# Patient Record
Sex: FEMALE | Race: WHITE | Employment: OTHER | URBAN - METROPOLITAN AREA
[De-identification: names, ages, dates, MRNs, and addresses within clinical notes are randomized per-mention and may not be internally consistent; named-entity substitution may affect disease eponyms.]

---

## 2018-07-24 ENCOUNTER — OFFICE VISIT (OUTPATIENT)
Dept: INTERNAL MEDICINE CLINIC | Facility: CLINIC | Age: 63
End: 2018-07-24
Payer: COMMERCIAL

## 2018-07-24 VITALS
BODY MASS INDEX: 20.27 KG/M2 | HEART RATE: 88 BPM | RESPIRATION RATE: 16 BRPM | WEIGHT: 106 LBS | DIASTOLIC BLOOD PRESSURE: 72 MMHG | TEMPERATURE: 98 F | HEIGHT: 60.5 IN | SYSTOLIC BLOOD PRESSURE: 122 MMHG

## 2018-07-24 DIAGNOSIS — Z78.0 POSTMENOPAUSAL: ICD-10-CM

## 2018-07-24 DIAGNOSIS — R23.8 EASY BRUISABILITY: ICD-10-CM

## 2018-07-24 DIAGNOSIS — R19.7 DIARRHEA, UNSPECIFIED TYPE: ICD-10-CM

## 2018-07-24 DIAGNOSIS — Z80.0 FAMILY HISTORY OF ESOPHAGEAL CANCER: ICD-10-CM

## 2018-07-24 DIAGNOSIS — Z12.31 ENCOUNTER FOR SCREENING MAMMOGRAM FOR MALIGNANT NEOPLASM OF BREAST: Primary | ICD-10-CM

## 2018-07-24 DIAGNOSIS — Z00.00 LABORATORY EXAMINATION ORDERED AS PART OF A COMPLETE PHYSICAL EXAMINATION: ICD-10-CM

## 2018-07-24 DIAGNOSIS — L98.9 SKIN LESION: ICD-10-CM

## 2018-07-24 DIAGNOSIS — F17.218 CIGARETTE NICOTINE DEPENDENCE WITH OTHER NICOTINE-INDUCED DISORDER: Chronic | ICD-10-CM

## 2018-07-24 DIAGNOSIS — Z12.11 SCREEN FOR COLON CANCER: ICD-10-CM

## 2018-07-24 PROCEDURE — 99214 OFFICE O/P EST MOD 30 MIN: CPT | Performed by: NURSE PRACTITIONER

## 2018-07-24 NOTE — PROGRESS NOTES
Ethan Jordan is a 58year old female who presents as a new patient to establish:       Patient complains of:   Here to establish     Has not seen a physician in a few years. Overdue for health maintenance.      Skin lesions  Back and chest   Notes coon yes . Children: yes. Exercise: minimal.  Diet: watches fats closely and watches sugar closely  Smoker: Yes    Cessation advised:  she is not wanting help at this time. Alcohol Use:  yes      Concerns:  S/p fall in March. ETOH abuse  Fall with fracture. auscultation  CARDIO: RRR without murmur  GI: good BS's,no masses, HSM or tenderness  NEURO: Oriented times three    ASSESSMENT AND PLAN:      Skin lesion  Refer to derm  Dr Camelia Rosales  Family history of esophageal cancer  Would benefit from EGD  Easy bruis

## 2018-08-17 ENCOUNTER — LAB ENCOUNTER (OUTPATIENT)
Dept: LAB | Age: 63
End: 2018-08-17
Attending: NURSE PRACTITIONER
Payer: COMMERCIAL

## 2018-08-17 DIAGNOSIS — Z78.0 POSTMENOPAUSAL: ICD-10-CM

## 2018-08-17 DIAGNOSIS — R73.9 HYPERGLYCEMIA: ICD-10-CM

## 2018-08-17 DIAGNOSIS — Z00.00 LABORATORY EXAMINATION ORDERED AS PART OF A COMPLETE PHYSICAL EXAMINATION: ICD-10-CM

## 2018-08-17 DIAGNOSIS — R23.8 EASY BRUISABILITY: ICD-10-CM

## 2018-08-17 LAB
ALBUMIN SERPL-MCNC: 3.7 G/DL (ref 3.5–4.8)
ALBUMIN/GLOB SERPL: 1.1 {RATIO} (ref 1–2)
ALP LIVER SERPL-CCNC: 136 U/L (ref 50–130)
ALT SERPL-CCNC: 23 U/L (ref 14–54)
ANION GAP SERPL CALC-SCNC: 10 MMOL/L (ref 0–18)
APTT PPP: 34.2 SECONDS (ref 26.1–34.6)
AST SERPL-CCNC: 32 U/L (ref 15–41)
BASOPHILS # BLD AUTO: 0.05 X10(3) UL (ref 0–0.1)
BASOPHILS NFR BLD AUTO: 0.8 %
BILIRUB SERPL-MCNC: 0.7 MG/DL (ref 0.1–2)
BUN BLD-MCNC: 3 MG/DL (ref 8–20)
BUN/CREAT SERPL: 4.5 (ref 10–20)
CALCIUM BLD-MCNC: 9.3 MG/DL (ref 8.3–10.3)
CHLORIDE SERPL-SCNC: 100 MMOL/L (ref 101–111)
CHOLEST SMN-MCNC: 247 MG/DL (ref ?–200)
CO2 SERPL-SCNC: 25 MMOL/L (ref 22–32)
CREAT BLD-MCNC: 0.66 MG/DL (ref 0.55–1.02)
EOSINOPHIL # BLD AUTO: 0.07 X10(3) UL (ref 0–0.3)
EOSINOPHIL NFR BLD AUTO: 1.1 %
ERYTHROCYTE [DISTWIDTH] IN BLOOD BY AUTOMATED COUNT: 13.9 % (ref 11.5–16)
GLOBULIN PLAS-MCNC: 3.5 G/DL (ref 2.5–4)
GLUCOSE BLD-MCNC: 120 MG/DL (ref 70–99)
HCT VFR BLD AUTO: 43 % (ref 34–50)
HDLC SERPL-MCNC: 116 MG/DL (ref 40–59)
HGB BLD-MCNC: 14.4 G/DL (ref 12–16)
IMMATURE GRANULOCYTE COUNT: 0.02 X10(3) UL (ref 0–1)
IMMATURE GRANULOCYTE RATIO %: 0.3 %
INR BLD: 0.89 (ref 0.9–1.1)
LDLC SERPL CALC-MCNC: 113 MG/DL (ref ?–100)
LYMPHOCYTES # BLD AUTO: 1.41 X10(3) UL (ref 0.9–4)
LYMPHOCYTES NFR BLD AUTO: 21.3 %
M PROTEIN MFR SERPL ELPH: 7.2 G/DL (ref 6.1–8.3)
MCH RBC QN AUTO: 35.6 PG (ref 27–33.2)
MCHC RBC AUTO-ENTMCNC: 33.5 G/DL (ref 31–37)
MCV RBC AUTO: 106.4 FL (ref 81–100)
MONOCYTES # BLD AUTO: 0.44 X10(3) UL (ref 0.1–1)
MONOCYTES NFR BLD AUTO: 6.7 %
NEUTROPHIL ABS PRELIM: 4.62 X10 (3) UL (ref 1.3–6.7)
NEUTROPHILS # BLD AUTO: 4.62 X10(3) UL (ref 1.3–6.7)
NEUTROPHILS NFR BLD AUTO: 69.8 %
NONHDLC SERPL-MCNC: 131 MG/DL (ref ?–130)
OSMOLALITY SERPL CALC.SUM OF ELEC: 278 MOSM/KG (ref 275–295)
PLATELET # BLD AUTO: 242 10(3)UL (ref 150–450)
POTASSIUM SERPL-SCNC: 3.8 MMOL/L (ref 3.6–5.1)
PSA SERPL DL<=0.01 NG/ML-MCNC: 12.4 SECONDS (ref 12.4–14.7)
RBC # BLD AUTO: 4.04 X10(6)UL (ref 3.8–5.1)
RED CELL DISTRIBUTION WIDTH-SD: 54.7 FL (ref 35.1–46.3)
SODIUM SERPL-SCNC: 135 MMOL/L (ref 136–144)
TRIGL SERPL-MCNC: 88 MG/DL (ref 30–149)
TSI SER-ACNC: 1.1 MIU/ML (ref 0.35–5.5)
VIT D+METAB SERPL-MCNC: 9.8 NG/ML (ref 30–100)
VLDLC SERPL CALC-MCNC: 18 MG/DL (ref 0–30)
WBC # BLD AUTO: 6.6 X10(3) UL (ref 4–13)

## 2018-08-17 PROCEDURE — 85610 PROTHROMBIN TIME: CPT | Performed by: NURSE PRACTITIONER

## 2018-08-17 PROCEDURE — 85730 THROMBOPLASTIN TIME PARTIAL: CPT | Performed by: NURSE PRACTITIONER

## 2018-08-17 PROCEDURE — 82306 VITAMIN D 25 HYDROXY: CPT | Performed by: NURSE PRACTITIONER

## 2018-08-17 PROCEDURE — 80061 LIPID PANEL: CPT | Performed by: NURSE PRACTITIONER

## 2018-08-17 PROCEDURE — 83036 HEMOGLOBIN GLYCOSYLATED A1C: CPT | Performed by: NURSE PRACTITIONER

## 2018-08-17 PROCEDURE — 80050 GENERAL HEALTH PANEL: CPT | Performed by: NURSE PRACTITIONER

## 2018-08-19 DIAGNOSIS — R73.9 HYPERGLYCEMIA: Primary | ICD-10-CM

## 2018-08-19 LAB
EST. AVERAGE GLUCOSE BLD GHB EST-MCNC: 71 MG/DL (ref 68–126)
HBA1C MFR BLD HPLC: 4.1 % (ref ?–5.7)

## 2018-08-29 ENCOUNTER — APPOINTMENT (OUTPATIENT)
Dept: LAB | Age: 63
End: 2018-08-29
Attending: NURSE PRACTITIONER
Payer: COMMERCIAL

## 2018-08-29 ENCOUNTER — OFFICE VISIT (OUTPATIENT)
Dept: INTERNAL MEDICINE CLINIC | Facility: CLINIC | Age: 63
End: 2018-08-29
Payer: COMMERCIAL

## 2018-08-29 VITALS
BODY MASS INDEX: 21.39 KG/M2 | SYSTOLIC BLOOD PRESSURE: 100 MMHG | TEMPERATURE: 98 F | WEIGHT: 111.81 LBS | DIASTOLIC BLOOD PRESSURE: 66 MMHG | HEIGHT: 60.5 IN | HEART RATE: 108 BPM

## 2018-08-29 DIAGNOSIS — R53.83 FATIGUE, UNSPECIFIED TYPE: ICD-10-CM

## 2018-08-29 DIAGNOSIS — Z80.0 FAMILY HISTORY OF ESOPHAGEAL CANCER: ICD-10-CM

## 2018-08-29 DIAGNOSIS — F17.200 SMOKER: ICD-10-CM

## 2018-08-29 DIAGNOSIS — E55.9 VITAMIN D DEFICIENCY: Primary | ICD-10-CM

## 2018-08-29 LAB — HAV AB SERPL IA-ACNC: 412 PG/ML (ref 193–986)

## 2018-08-29 PROCEDURE — 82607 VITAMIN B-12: CPT | Performed by: NURSE PRACTITIONER

## 2018-08-29 PROCEDURE — 99214 OFFICE O/P EST MOD 30 MIN: CPT | Performed by: NURSE PRACTITIONER

## 2018-08-29 RX ORDER — ERGOCALCIFEROL 1.25 MG/1
50000 CAPSULE ORAL WEEKLY
Qty: 12 CAPSULE | Refills: 0 | Status: SHIPPED | OUTPATIENT
Start: 2018-08-29 | End: 2018-09-28

## 2018-08-29 RX ORDER — NICOTINE 21 MG/24HR
1 PATCH, TRANSDERMAL 24 HOURS TRANSDERMAL EVERY 24 HOURS
Qty: 30 PATCH | Refills: 0 | Status: SHIPPED | OUTPATIENT
Start: 2018-08-29 | End: 2019-01-01

## 2018-08-29 RX ORDER — NICOTINE 21 MG/24HR
1 PATCH, TRANSDERMAL 24 HOURS TRANSDERMAL EVERY 24 HOURS
Qty: 14 PATCH | Refills: 0 | Status: SHIPPED | OUTPATIENT
Start: 2018-08-29 | End: 2019-01-01

## 2018-08-29 NOTE — PROGRESS NOTES
Shaista Spear is a 58year old female. Patient presents with:  Test Results: LG. Room  11. HPI:   Here to review labs. New keesha in July for CPE. Smoking  Wants to quit.    Did well with nicoderm in the past.   Smoking cessation assistanc Packs/day: 0.25      Years: 0.00         Types: Cigarettes  Smokeless tobacco: Never Used                      Alcohol use: Yes           1.8 oz/week     Shots of liquor: 3 per week     Comment: cage 7/24/18    Family History   Problem Relation Age of Onse B12 [E]    Meds & Refills for this Visit:  Signed Prescriptions Disp Refills    ergocalciferol 12741 units Oral Cap 12 capsule 0      Sig: Take 1 capsule (50,000 Units total) by mouth once a week.       nicotine 21 MG/24HR Transdermal Patch 24 Hr 30 patch 0

## 2018-08-29 NOTE — PATIENT INSTRUCTIONS
Smoking cessation assistance: (Transdermal, Habitrol®) If currently smoking more than 10 cigarettes/day: Apply one 21-mg patch transdermally daily for weeks 1 through 4, then one 14-mg patch daily for weeks 5 and 6, and then one 7-mg patch daily for weeks

## 2019-01-01 ENCOUNTER — APPOINTMENT (OUTPATIENT)
Dept: GENERAL RADIOLOGY | Age: 64
End: 2019-01-01
Attending: NURSE PRACTITIONER
Payer: COMMERCIAL

## 2019-01-01 ENCOUNTER — APPOINTMENT (OUTPATIENT)
Dept: BONE DENSITY | Age: 64
End: 2019-01-01
Attending: NURSE PRACTITIONER
Payer: COMMERCIAL

## 2019-01-01 ENCOUNTER — OFFICE VISIT (OUTPATIENT)
Dept: INTERNAL MEDICINE CLINIC | Facility: CLINIC | Age: 64
End: 2019-01-01
Payer: COMMERCIAL

## 2019-01-01 ENCOUNTER — TELEPHONE (OUTPATIENT)
Dept: INTERNAL MEDICINE CLINIC | Facility: CLINIC | Age: 64
End: 2019-01-01

## 2019-01-01 ENCOUNTER — HOSPITAL ENCOUNTER (OUTPATIENT)
Dept: GENERAL RADIOLOGY | Age: 64
Discharge: HOME | End: 2019-01-01
Attending: NURSE PRACTITIONER
Payer: COMMERCIAL

## 2019-01-01 ENCOUNTER — LAB ENCOUNTER (OUTPATIENT)
Dept: LAB | Age: 64
End: 2019-01-01
Attending: NURSE PRACTITIONER
Payer: COMMERCIAL

## 2019-01-01 ENCOUNTER — HOSPITAL ENCOUNTER (OUTPATIENT)
Dept: MRI IMAGING | Age: 64
Discharge: HOME | End: 2019-01-01
Attending: NURSE PRACTITIONER
Payer: COMMERCIAL

## 2019-01-01 ENCOUNTER — HOSPITAL ENCOUNTER (OUTPATIENT)
Dept: MAMMOGRAPHY | Age: 64
End: 2019-01-01
Attending: NURSE PRACTITIONER
Payer: COMMERCIAL

## 2019-01-01 ENCOUNTER — HOSPITAL ENCOUNTER (OUTPATIENT)
Dept: BONE DENSITY | Age: 64
Discharge: HOME | End: 2019-01-01
Attending: NURSE PRACTITIONER
Payer: COMMERCIAL

## 2019-01-01 ENCOUNTER — APPOINTMENT (OUTPATIENT)
Dept: LAB | Age: 64
End: 2019-01-01
Attending: INTERNAL MEDICINE
Payer: COMMERCIAL

## 2019-01-01 ENCOUNTER — HOSPITAL ENCOUNTER (OUTPATIENT)
Dept: CT IMAGING | Age: 64
Discharge: HOME | End: 2019-01-01
Attending: NURSE PRACTITIONER
Payer: COMMERCIAL

## 2019-01-01 ENCOUNTER — HOSPITAL ENCOUNTER (OUTPATIENT)
Dept: MAMMOGRAPHY | Facility: HOSPITAL | Age: 64
Discharge: HOME | End: 2019-01-01
Attending: NURSE PRACTITIONER
Payer: COMMERCIAL

## 2019-01-01 ENCOUNTER — HOSPITAL ENCOUNTER (OUTPATIENT)
Dept: ULTRASOUND IMAGING | Age: 64
Discharge: HOME | End: 2019-01-01
Attending: INTERNAL MEDICINE
Payer: COMMERCIAL

## 2019-01-01 ENCOUNTER — HOSPITAL ENCOUNTER (OUTPATIENT)
Dept: MAMMOGRAPHY | Age: 64
Discharge: HOME | End: 2019-01-01
Attending: NURSE PRACTITIONER
Payer: COMMERCIAL

## 2019-01-01 VITALS
SYSTOLIC BLOOD PRESSURE: 142 MMHG | TEMPERATURE: 98 F | DIASTOLIC BLOOD PRESSURE: 90 MMHG | RESPIRATION RATE: 28 BRPM | WEIGHT: 117 LBS | HEIGHT: 60.5 IN | HEART RATE: 108 BPM | BODY MASS INDEX: 22.38 KG/M2

## 2019-01-01 DIAGNOSIS — E55.9 VITAMIN D DEFICIENCY: ICD-10-CM

## 2019-01-01 DIAGNOSIS — Z78.0 POSTMENOPAUSAL: ICD-10-CM

## 2019-01-01 DIAGNOSIS — Z13.29 SCREENING FOR THYROID DISORDER: ICD-10-CM

## 2019-01-01 DIAGNOSIS — R79.89 ELEVATED LFTS: ICD-10-CM

## 2019-01-01 DIAGNOSIS — R19.7 DIARRHEA, UNSPECIFIED TYPE: ICD-10-CM

## 2019-01-01 DIAGNOSIS — E87.6 HYPOKALEMIA: ICD-10-CM

## 2019-01-01 DIAGNOSIS — F17.200 SMOKER: ICD-10-CM

## 2019-01-01 DIAGNOSIS — R10.2 PELVIC PAIN: ICD-10-CM
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
## 2019-01-01 DIAGNOSIS — Z12.31 ENCOUNTER FOR SCREENING MAMMOGRAM FOR MALIGNANT NEOPLASM OF BREAST: ICD-10-CM

## 2019-01-01 DIAGNOSIS — R59.9 ADENOPATHY: ICD-10-CM

## 2019-01-01 DIAGNOSIS — M54.50 ACUTE MIDLINE LOW BACK PAIN WITHOUT SCIATICA: ICD-10-CM

## 2019-01-01 DIAGNOSIS — R93.89 ABNORMAL CXR: ICD-10-CM

## 2019-01-01 DIAGNOSIS — R92.2 INCONCLUSIVE MAMMOGRAM: ICD-10-CM

## 2019-01-01 DIAGNOSIS — Z87.81 HISTORY OF FRACTURE OF HUMERUS: ICD-10-CM

## 2019-01-01 DIAGNOSIS — R68.81 EARLY SATIETY: ICD-10-CM

## 2019-01-01 DIAGNOSIS — Z00.00 ROUTINE GENERAL MEDICAL EXAMINATION AT A HEALTH CARE FACILITY: Primary | ICD-10-CM

## 2019-01-01 DIAGNOSIS — R26.81 UNSTEADY GAIT: ICD-10-CM

## 2019-01-01 DIAGNOSIS — R93.89 ABNORMAL CT OF THE CHEST: ICD-10-CM

## 2019-01-01 DIAGNOSIS — Z00.00 ROUTINE GENERAL MEDICAL EXAMINATION AT A HEALTH CARE FACILITY: ICD-10-CM

## 2019-01-01 DIAGNOSIS — Z13.228 SCREENING FOR METABOLIC DISORDER: ICD-10-CM

## 2019-01-01 DIAGNOSIS — F17.218 CIGARETTE NICOTINE DEPENDENCE WITH OTHER NICOTINE-INDUCED DISORDER: Chronic | ICD-10-CM

## 2019-01-01 DIAGNOSIS — Z11.51 SCREENING FOR HUMAN PAPILLOMAVIRUS (HPV): ICD-10-CM

## 2019-01-01 DIAGNOSIS — D58.2 ELEVATED HEMOGLOBIN (HCC): ICD-10-CM

## 2019-01-01 DIAGNOSIS — E87.6 HYPOKALEMIA: Primary | ICD-10-CM

## 2019-01-01 DIAGNOSIS — R05.9 COUGH: ICD-10-CM

## 2019-01-01 DIAGNOSIS — F17.218 CIGARETTE NICOTINE DEPENDENCE WITH OTHER NICOTINE-INDUCED DISORDER: ICD-10-CM

## 2019-01-01 DIAGNOSIS — Z12.11 SCREEN FOR COLON CANCER: ICD-10-CM

## 2019-01-01 DIAGNOSIS — R93.89 ABNORMAL CXR: Primary | ICD-10-CM

## 2019-01-01 DIAGNOSIS — R25.1 TREMOR: ICD-10-CM

## 2019-01-01 DIAGNOSIS — R05.9 COUGH: Primary | ICD-10-CM

## 2019-01-01 DIAGNOSIS — Z12.4 SCREENING FOR CERVICAL CANCER: ICD-10-CM

## 2019-01-01 DIAGNOSIS — Z80.0 FAMILY HISTORY OF ESOPHAGEAL CANCER: ICD-10-CM

## 2019-01-01 DIAGNOSIS — Z13.0 SCREENING FOR DISORDER OF BLOOD AND BLOOD-FORMING ORGANS: ICD-10-CM

## 2019-01-01 DIAGNOSIS — R03.0 ELEVATED BLOOD PRESSURE READING WITHOUT DIAGNOSIS OF HYPERTENSION: ICD-10-CM

## 2019-01-01 DIAGNOSIS — Z13.220 SCREENING FOR LIPID DISORDERS: ICD-10-CM

## 2019-01-01 LAB
ALBUMIN SERPL-MCNC: 3.1 G/DL (ref 3.4–5)
ALBUMIN SERPL-MCNC: 3.4 G/DL (ref 3.4–5)
ALBUMIN/GLOB SERPL: 0.9 {RATIO} (ref 1–2)
ALBUMIN/GLOB SERPL: 1 {RATIO} (ref 1–2)
ALP LIVER SERPL-CCNC: 152 U/L (ref 50–130)
ALP LIVER SERPL-CCNC: 200 U/L (ref 50–130)
ALT SERPL-CCNC: 55 U/L (ref 13–56)
ALT SERPL-CCNC: 67 U/L (ref 13–56)
ANION GAP SERPL CALC-SCNC: 11 MMOL/L (ref 0–18)
ANION GAP SERPL CALC-SCNC: 15 MMOL/L (ref 0–18)
AST SERPL-CCNC: 184 U/L (ref 15–37)
AST SERPL-CCNC: 87 U/L (ref 15–37)
BASOPHILS # BLD AUTO: 0.04 X10(3) UL (ref 0–0.2)
BASOPHILS NFR BLD AUTO: 0.8 %
BILIRUB SERPL-MCNC: 1.5 MG/DL (ref 0.1–2)
BILIRUB SERPL-MCNC: 1.8 MG/DL (ref 0.1–2)
BUN BLD-MCNC: 5 MG/DL (ref 7–18)
BUN BLD-MCNC: 7 MG/DL (ref 7–18)
BUN/CREAT SERPL: 7.5 (ref 10–20)
BUN/CREAT SERPL: 9.2 (ref 10–20)
CALCIUM BLD-MCNC: 8 MG/DL (ref 8.5–10.1)
CALCIUM BLD-MCNC: 8.9 MG/DL (ref 8.5–10.1)
CHLORIDE SERPL-SCNC: 102 MMOL/L (ref 98–112)
CHLORIDE SERPL-SCNC: 97 MMOL/L (ref 98–112)
CHOLEST SMN-MCNC: 237 MG/DL (ref ?–200)
CO2 SERPL-SCNC: 21 MMOL/L (ref 21–32)
CO2 SERPL-SCNC: 26 MMOL/L (ref 21–32)
CREAT BLD-MCNC: 0.67 MG/DL (ref 0.55–1.02)
CREAT BLD-MCNC: 0.76 MG/DL (ref 0.55–1.02)
DEPRECATED HBV CORE AB SER IA-ACNC: 588.4 NG/ML (ref 18–340)
DEPRECATED RDW RBC AUTO: 53.4 FL (ref 35.1–46.3)
EOSINOPHIL # BLD AUTO: 0.02 X10(3) UL (ref 0–0.7)
EOSINOPHIL NFR BLD AUTO: 0.4 %
ERYTHROCYTE [DISTWIDTH] IN BLOOD BY AUTOMATED COUNT: 13.5 % (ref 11–15)
GLOBULIN PLAS-MCNC: 3.4 G/DL (ref 2.8–4.4)
GLOBULIN PLAS-MCNC: 3.4 G/DL (ref 2.8–4.4)
GLUCOSE BLD-MCNC: 103 MG/DL (ref 70–99)
GLUCOSE BLD-MCNC: 99 MG/DL (ref 70–99)
GLUCOSE UR STRIP.AUTO-MCNC: NEGATIVE MG/DL
HAV IGM SER QL: NONREACTIVE
HBV CORE IGM SER QL: NONREACTIVE
HBV SURFACE AG SERPL QL IA: NONREACTIVE
HCT VFR BLD AUTO: 46.6 % (ref 35–48)
HCV AB SERPL QL IA: NONREACTIVE
HDLC SERPL-MCNC: 124 MG/DL (ref 40–59)
HGB BLD-MCNC: 16.3 G/DL (ref 12–16)
HPV I/H RISK 1 DNA SPEC QL NAA+PROBE: POSITIVE
HPV16 DNA CVX QL PROBE+SIG AMP: NEGATIVE
HPV18 DNA CVX QL PROBE+SIG AMP: NEGATIVE
HYALINE CASTS #/AREA URNS AUTO: PRESENT /LPF
IMM GRANULOCYTES # BLD AUTO: 0.02 X10(3) UL (ref 0–1)
IMM GRANULOCYTES NFR BLD: 0.4 %
KETONES UR STRIP.AUTO-MCNC: 20 MG/DL
LAST PAP RESULT: NORMAL
LDLC SERPL CALC-MCNC: 93 MG/DL (ref ?–100)
LYMPHOCYTES # BLD AUTO: 1.21 X10(3) UL (ref 1–4)
LYMPHOCYTES NFR BLD AUTO: 23.3 %
M PROTEIN MFR SERPL ELPH: 6.5 G/DL (ref 6.4–8.2)
M PROTEIN MFR SERPL ELPH: 6.8 G/DL (ref 6.4–8.2)
MCH RBC QN AUTO: 37 PG (ref 26–34)
MCHC RBC AUTO-ENTMCNC: 35 G/DL (ref 31–37)
MCV RBC AUTO: 105.9 FL (ref 80–100)
MONOCYTES # BLD AUTO: 0.31 X10(3) UL (ref 0.1–1)
MONOCYTES NFR BLD AUTO: 6 %
NEUTROPHILS # BLD AUTO: 3.6 X10 (3) UL (ref 1.5–7.7)
NEUTROPHILS # BLD AUTO: 3.6 X10(3) UL (ref 1.5–7.7)
NEUTROPHILS NFR BLD AUTO: 69.1 %
NITRITE UR QL STRIP.AUTO: NEGATIVE
NONHDLC SERPL-MCNC: 113 MG/DL (ref ?–130)
OSMOLALITY SERPL CALC.SUM OF ELEC: 274 MOSM/KG (ref 275–295)
OSMOLALITY SERPL CALC.SUM OF ELEC: 285 MOSM/KG (ref 275–295)
PH UR STRIP.AUTO: 5 [PH] (ref 4.5–8)
PLATELET # BLD AUTO: 208 10(3)UL (ref 150–450)
POTASSIUM SERPL-SCNC: 3.3 MMOL/L (ref 3.5–5.1)
POTASSIUM SERPL-SCNC: 3.4 MMOL/L (ref 3.5–5.1)
PROT UR STRIP.AUTO-MCNC: 30 MG/DL
RBC # BLD AUTO: 4.4 X10(6)UL (ref 3.8–5.3)
SODIUM SERPL-SCNC: 133 MMOL/L (ref 136–145)
SODIUM SERPL-SCNC: 139 MMOL/L (ref 136–145)
SP GR UR STRIP.AUTO: 1.02 (ref 1–1.03)
TRIGL SERPL-MCNC: 99 MG/DL (ref 30–149)
TSI SER-ACNC: 1.12 MIU/ML (ref 0.36–3.74)
UROBILINOGEN UR STRIP.AUTO-MCNC: 2 MG/DL
VIT D+METAB SERPL-MCNC: 12.6 NG/ML (ref 30–100)
VLDLC SERPL CALC-MCNC: 20 MG/DL (ref 0–30)
WBC # BLD AUTO: 5.2 X10(3) UL (ref 4–11)
YEAST URINE: PRESENT

## 2019-01-01 PROCEDURE — 82104 ALPHA-1-ANTITRYPSIN PHENO: CPT

## 2019-01-01 PROCEDURE — 80076 HEPATIC FUNCTION PANEL: CPT

## 2019-01-01 PROCEDURE — 72110 X-RAY EXAM L-2 SPINE 4/>VWS: CPT | Performed by: NURSE PRACTITIONER

## 2019-01-01 PROCEDURE — 72190 X-RAY EXAM OF PELVIS: CPT | Performed by: NURSE PRACTITIONER

## 2019-01-01 PROCEDURE — 90732 PPSV23 VACC 2 YRS+ SUBQ/IM: CPT | Performed by: NURSE PRACTITIONER

## 2019-01-01 PROCEDURE — 88175 CYTOPATH C/V AUTO FLUID REDO: CPT | Performed by: NURSE PRACTITIONER

## 2019-01-01 PROCEDURE — 77066 DX MAMMO INCL CAD BI: CPT | Performed by: NURSE PRACTITIONER

## 2019-01-01 PROCEDURE — 77063 BREAST TOMOSYNTHESIS BI: CPT | Performed by: NURSE PRACTITIONER

## 2019-01-01 PROCEDURE — 83516 IMMUNOASSAY NONANTIBODY: CPT

## 2019-01-01 PROCEDURE — 82565 ASSAY OF CREATININE: CPT

## 2019-01-01 PROCEDURE — 83550 IRON BINDING TEST: CPT

## 2019-01-01 PROCEDURE — 80074 ACUTE HEPATITIS PANEL: CPT | Performed by: NURSE PRACTITIONER

## 2019-01-01 PROCEDURE — 77080 DXA BONE DENSITY AXIAL: CPT | Performed by: NURSE PRACTITIONER

## 2019-01-01 PROCEDURE — 82306 VITAMIN D 25 HYDROXY: CPT | Performed by: NURSE PRACTITIONER

## 2019-01-01 PROCEDURE — 80053 COMPREHEN METABOLIC PANEL: CPT | Performed by: NURSE PRACTITIONER

## 2019-01-01 PROCEDURE — 99396 PREV VISIT EST AGE 40-64: CPT | Performed by: NURSE PRACTITIONER

## 2019-01-01 PROCEDURE — 77062 BREAST TOMOSYNTHESIS BI: CPT | Performed by: NURSE PRACTITIONER

## 2019-01-01 PROCEDURE — 90471 IMMUNIZATION ADMIN: CPT | Performed by: NURSE PRACTITIONER

## 2019-01-01 PROCEDURE — 82728 ASSAY OF FERRITIN: CPT | Performed by: NURSE PRACTITIONER

## 2019-01-01 PROCEDURE — 82103 ALPHA-1-ANTITRYPSIN TOTAL: CPT

## 2019-01-01 PROCEDURE — 87624 HPV HI-RISK TYP POOLED RSLT: CPT | Performed by: NURSE PRACTITIONER

## 2019-01-01 PROCEDURE — 82728 ASSAY OF FERRITIN: CPT

## 2019-01-01 PROCEDURE — 36415 COLL VENOUS BLD VENIPUNCTURE: CPT | Performed by: NURSE PRACTITIONER

## 2019-01-01 PROCEDURE — 71046 X-RAY EXAM CHEST 2 VIEWS: CPT | Performed by: NURSE PRACTITIONER

## 2019-01-01 PROCEDURE — 80061 LIPID PANEL: CPT | Performed by: NURSE PRACTITIONER

## 2019-01-01 PROCEDURE — 82784 ASSAY IGA/IGD/IGG/IGM EACH: CPT

## 2019-01-01 PROCEDURE — 83540 ASSAY OF IRON: CPT

## 2019-01-01 PROCEDURE — 36415 COLL VENOUS BLD VENIPUNCTURE: CPT

## 2019-01-01 PROCEDURE — 77067 SCR MAMMO BI INCL CAD: CPT | Performed by: NURSE PRACTITIONER

## 2019-01-01 PROCEDURE — 81001 URINALYSIS AUTO W/SCOPE: CPT | Performed by: NURSE PRACTITIONER

## 2019-01-01 PROCEDURE — 87625 HPV TYPES 16 & 18 ONLY: CPT | Performed by: NURSE PRACTITIONER

## 2019-01-01 PROCEDURE — 71260 CT THORAX DX C+: CPT | Performed by: NURSE PRACTITIONER

## 2019-01-01 PROCEDURE — 80050 GENERAL HEALTH PANEL: CPT | Performed by: NURSE PRACTITIONER

## 2019-01-01 PROCEDURE — 87086 URINE CULTURE/COLONY COUNT: CPT | Performed by: NURSE PRACTITIONER

## 2019-01-01 PROCEDURE — 70551 MRI BRAIN STEM W/O DYE: CPT | Performed by: NURSE PRACTITIONER

## 2019-01-01 PROCEDURE — 76642 ULTRASOUND BREAST LIMITED: CPT | Performed by: NURSE PRACTITIONER

## 2019-01-01 PROCEDURE — 76700 US EXAM ABDOM COMPLETE: CPT | Performed by: INTERNAL MEDICINE

## 2019-01-01 PROCEDURE — 86708 HEPATITIS A ANTIBODY: CPT

## 2019-01-01 PROCEDURE — 86706 HEP B SURFACE ANTIBODY: CPT

## 2019-01-01 RX ORDER — AZITHROMYCIN 250 MG/1
TABLET, FILM COATED ORAL
Qty: 6 TABLET | Refills: 0 | Status: SHIPPED | OUTPATIENT
Start: 2019-01-01 | End: 2020-01-01 | Stop reason: ALTCHOICE

## 2019-01-01 RX ORDER — POTASSIUM CHLORIDE 1500 MG/1
40 TABLET, FILM COATED, EXTENDED RELEASE ORAL DAILY
Qty: 4 TABLET | Refills: 0 | Status: SHIPPED | OUTPATIENT
Start: 2019-01-01 | End: 2019-01-01

## 2019-04-10 ENCOUNTER — TELEPHONE (OUTPATIENT)
Dept: INTERNAL MEDICINE CLINIC | Facility: CLINIC | Age: 64
End: 2019-04-10

## 2019-04-17 ENCOUNTER — TELEPHONE (OUTPATIENT)
Dept: INTERNAL MEDICINE CLINIC | Facility: CLINIC | Age: 64
End: 2019-04-17

## 2019-04-17 DIAGNOSIS — Z00.00 ROUTINE GENERAL MEDICAL EXAMINATION AT A HEALTH CARE FACILITY: Primary | ICD-10-CM

## 2019-04-17 DIAGNOSIS — Z13.29 SCREENING FOR THYROID DISORDER: ICD-10-CM

## 2019-04-17 DIAGNOSIS — Z13.0 SCREENING FOR DISORDER OF BLOOD AND BLOOD-FORMING ORGANS: ICD-10-CM

## 2019-04-17 DIAGNOSIS — Z13.228 SCREENING FOR METABOLIC DISORDER: ICD-10-CM

## 2019-04-17 DIAGNOSIS — Z13.220 SCREENING FOR LIPID DISORDERS: ICD-10-CM

## 2019-04-17 NOTE — TELEPHONE ENCOUNTER
Future Appointments   Date Time Provider Evelia Renuka   7/26/2019  1:30 PM Erlene Days, APRN [facility]     Pt would like BW orders sent to [facility] pls.  Pt aware to fast.

## 2019-08-14 PROBLEM — R25.1 TREMOR: Status: ACTIVE | Noted: 2019-01-01

## 2019-08-14 PROBLEM — R05.9 COUGH: Status: ACTIVE | Noted: 2019-01-01

## 2019-08-14 PROBLEM — R03.0 ELEVATED BLOOD PRESSURE READING WITHOUT DIAGNOSIS OF HYPERTENSION: Status: ACTIVE | Noted: 2019-01-01

## 2019-08-14 PROBLEM — R26.81 UNSTEADY GAIT: Status: ACTIVE | Noted: 2019-01-01

## 2019-08-14 PROBLEM — Z78.0 POSTMENOPAUSAL: Status: ACTIVE | Noted: 2019-01-01

## 2019-08-14 PROBLEM — R68.81 EARLY SATIETY: Status: ACTIVE | Noted: 2019-01-01

## 2019-08-14 NOTE — PROGRESS NOTES
Juana Voss is a 61year old female who presents for a complete physical exam:       Patient complains of:    Overdue for f/u and multiple issues today. Smoker   Has cut back.   Cough  Worse in am.  Better during the day but still bothersome through by mouth daily with breakfast. Disp:  Rfl:    Multiple Vitamins-Minerals (HAIR/SKIN/NAILS) Oral Tab Take 1 tablet by mouth daily. Disp:  Rfl:       No past medical history on file.    Past Surgical History:   Procedure Laterality Date   • OTHER SURGICAL HIS Breastfeeding? No   BMI 22.47 kg/m²   Body mass index is 22.47 kg/m².    GENERAL: well developed, well nourished,in no apparent distress  SKIN: no rashes,no suspicious lesions  HEENT: atraumatic, normocephalic,ears and throat are clear  EYES:PERRLA, EOMI, Hpv Dna  High Risk , Thin Prep Collect      UA/M With Culture Reflex [E]      Pneumococcal 23, Adult (Pneumovax) (26154) (Dx V03.82/Z23)      ThinPrep PAP Smear      Meds & Refills for this Visit:  Requested Prescriptions      No prescriptions requested

## 2019-11-12 PROBLEM — R87.618 PAP SMEAR ABNORMALITY OF CERVIX/HUMAN PAPILLOMAVIRUS (HPV) POSITIVE: Status: ACTIVE | Noted: 2019-01-01

## 2019-11-12 PROBLEM — M81.0 AGE-RELATED OSTEOPOROSIS WITHOUT CURRENT PATHOLOGICAL FRACTURE: Status: ACTIVE | Noted: 2019-01-01

## 2019-11-12 PROBLEM — R93.89 ABNORMAL CHEST X-RAY: Status: ACTIVE | Noted: 2019-01-01

## 2019-11-12 PROBLEM — R79.89 ELEVATED FERRITIN: Status: ACTIVE | Noted: 2019-01-01

## 2019-11-12 PROBLEM — R87.610 ATYPICAL SQUAMOUS CELLS OF UNDETERMINED SIGNIFICANCE ON CYTOLOGIC SMEAR OF CERVIX (ASC-US): Status: ACTIVE | Noted: 2019-01-01

## 2019-11-12 PROBLEM — D58.2 ELEVATED HEMOGLOBIN (HCC): Status: ACTIVE | Noted: 2019-01-01

## 2019-11-13 NOTE — TELEPHONE ENCOUNTER
Spoke to , Raghavendra Candelaria, on HIPPA  Will order testing for her to have completed as she is apprehensive about returning to see me   CT Chest.    Referral to gyne. Repeat labs. Orders placed.    He is aware  Will share with her in hopes to engage her in her

## 2020-01-01 ENCOUNTER — APPOINTMENT (OUTPATIENT)
Dept: ULTRASOUND IMAGING | Facility: HOSPITAL | Age: 65
DRG: 391 | End: 2020-01-01
Attending: INTERNAL MEDICINE
Payer: COMMERCIAL

## 2020-01-01 ENCOUNTER — APPOINTMENT (OUTPATIENT)
Dept: CT IMAGING | Facility: HOSPITAL | Age: 65
DRG: 391 | End: 2020-01-01
Attending: EMERGENCY MEDICINE
Payer: COMMERCIAL

## 2020-01-01 ENCOUNTER — HOSPITAL ENCOUNTER (INPATIENT)
Facility: HOSPITAL | Age: 65
LOS: 3 days | Discharge: HOSPICE | DRG: 177 | End: 2020-01-01
Attending: HOSPITALIST | Admitting: HOSPITALIST
Payer: OTHER MISCELLANEOUS

## 2020-01-01 ENCOUNTER — APPOINTMENT (OUTPATIENT)
Dept: CT IMAGING | Facility: HOSPITAL | Age: 65
DRG: 391 | End: 2020-01-01
Attending: HOSPITALIST
Payer: COMMERCIAL

## 2020-01-01 ENCOUNTER — HOSPITAL ENCOUNTER (INPATIENT)
Facility: HOSPITAL | Age: 65
LOS: 15 days | Discharge: NURSING, REHAB OR LONG-TERM CARE | DRG: 391 | End: 2020-01-01
Attending: EMERGENCY MEDICINE | Admitting: HOSPITALIST
Payer: COMMERCIAL

## 2020-01-01 ENCOUNTER — EXTERNAL FACILITY (OUTPATIENT)
Dept: FAMILY MEDICINE CLINIC | Facility: CLINIC | Age: 65
End: 2020-01-01

## 2020-01-01 ENCOUNTER — APPOINTMENT (OUTPATIENT)
Dept: GENERAL RADIOLOGY | Facility: HOSPITAL | Age: 65
DRG: 391 | End: 2020-01-01
Attending: HOSPITALIST
Payer: COMMERCIAL

## 2020-01-01 ENCOUNTER — APPOINTMENT (OUTPATIENT)
Dept: GENERAL RADIOLOGY | Facility: HOSPITAL | Age: 65
DRG: 177 | End: 2020-01-01
Attending: EMERGENCY MEDICINE
Payer: COMMERCIAL

## 2020-01-01 ENCOUNTER — APPOINTMENT (OUTPATIENT)
Dept: GENERAL RADIOLOGY | Facility: HOSPITAL | Age: 65
DRG: 391 | End: 2020-01-01
Attending: RADIOLOGY
Payer: COMMERCIAL

## 2020-01-01 ENCOUNTER — ANESTHESIA (OUTPATIENT)
Dept: ENDOSCOPY | Facility: HOSPITAL | Age: 65
DRG: 391 | End: 2020-01-01
Payer: COMMERCIAL

## 2020-01-01 ENCOUNTER — APPOINTMENT (OUTPATIENT)
Dept: GENERAL RADIOLOGY | Facility: HOSPITAL | Age: 65
DRG: 177 | End: 2020-01-01
Attending: PHYSICIAN ASSISTANT
Payer: COMMERCIAL

## 2020-01-01 ENCOUNTER — TELEPHONE (OUTPATIENT)
Dept: INTERNAL MEDICINE CLINIC | Facility: CLINIC | Age: 65
End: 2020-01-01

## 2020-01-01 ENCOUNTER — APPOINTMENT (OUTPATIENT)
Dept: GENERAL RADIOLOGY | Facility: HOSPITAL | Age: 65
DRG: 177 | End: 2020-01-01
Attending: INTERNAL MEDICINE
Payer: COMMERCIAL

## 2020-01-01 ENCOUNTER — APPOINTMENT (OUTPATIENT)
Dept: CT IMAGING | Facility: HOSPITAL | Age: 65
DRG: 177 | End: 2020-01-01
Attending: EMERGENCY MEDICINE
Payer: COMMERCIAL

## 2020-01-01 ENCOUNTER — ANESTHESIA EVENT (OUTPATIENT)
Dept: ENDOSCOPY | Facility: HOSPITAL | Age: 65
DRG: 391 | End: 2020-01-01
Payer: COMMERCIAL

## 2020-01-01 ENCOUNTER — MOBILE ENCOUNTER (OUTPATIENT)
Dept: FAMILY MEDICINE CLINIC | Facility: CLINIC | Age: 65
End: 2020-01-01

## 2020-01-01 ENCOUNTER — HOSPITAL ENCOUNTER (INPATIENT)
Facility: HOSPITAL | Age: 65
LOS: 11 days | Discharge: HOSPICE | DRG: 177 | End: 2020-01-01
Attending: EMERGENCY MEDICINE | Admitting: STUDENT IN AN ORGANIZED HEALTH CARE EDUCATION/TRAINING PROGRAM
Payer: COMMERCIAL

## 2020-01-01 ENCOUNTER — APPOINTMENT (OUTPATIENT)
Dept: GENERAL RADIOLOGY | Facility: HOSPITAL | Age: 65
DRG: 391 | End: 2020-01-01
Attending: EMERGENCY MEDICINE
Payer: COMMERCIAL

## 2020-01-01 VITALS
TEMPERATURE: 99 F | OXYGEN SATURATION: 94 % | RESPIRATION RATE: 17 BRPM | DIASTOLIC BLOOD PRESSURE: 64 MMHG | HEART RATE: 97 BPM | SYSTOLIC BLOOD PRESSURE: 137 MMHG

## 2020-01-01 VITALS
TEMPERATURE: 97 F | RESPIRATION RATE: 14 BRPM | SYSTOLIC BLOOD PRESSURE: 128 MMHG | BODY MASS INDEX: 21.43 KG/M2 | DIASTOLIC BLOOD PRESSURE: 94 MMHG | HEIGHT: 61.5 IN | OXYGEN SATURATION: 95 % | WEIGHT: 115 LBS | HEART RATE: 67 BPM

## 2020-01-01 VITALS
RESPIRATION RATE: 24 BRPM | DIASTOLIC BLOOD PRESSURE: 62 MMHG | TEMPERATURE: 100 F | OXYGEN SATURATION: 91 % | BODY MASS INDEX: 26.19 KG/M2 | HEART RATE: 110 BPM | WEIGHT: 138.69 LBS | SYSTOLIC BLOOD PRESSURE: 102 MMHG | HEIGHT: 61 IN

## 2020-01-01 DIAGNOSIS — I10 ESSENTIAL HYPERTENSION: ICD-10-CM

## 2020-01-01 DIAGNOSIS — D50.0 IRON DEFICIENCY ANEMIA SECONDARY TO BLOOD LOSS (CHRONIC): ICD-10-CM

## 2020-01-01 DIAGNOSIS — F32.A DEPRESSIVE DISORDER: ICD-10-CM

## 2020-01-01 DIAGNOSIS — R26.81 UNSTEADY GAIT: ICD-10-CM

## 2020-01-01 DIAGNOSIS — D50.0 IRON DEFICIENCY ANEMIA DUE TO CHRONIC BLOOD LOSS: ICD-10-CM

## 2020-01-01 DIAGNOSIS — E53.8 FOLATE DEFICIENCY: ICD-10-CM

## 2020-01-01 DIAGNOSIS — E87.6 HYPOKALEMIA: ICD-10-CM

## 2020-01-01 DIAGNOSIS — D53.9 MACROCYTIC ANEMIA: ICD-10-CM

## 2020-01-01 DIAGNOSIS — E83.42 HYPOMAGNESEMIA: ICD-10-CM

## 2020-01-01 DIAGNOSIS — R41.0 CONFUSION: Primary | ICD-10-CM

## 2020-01-01 DIAGNOSIS — R25.1 TREMOR: Primary | ICD-10-CM

## 2020-01-01 DIAGNOSIS — N30.00 ACUTE CYSTITIS WITHOUT HEMATURIA: ICD-10-CM

## 2020-01-01 DIAGNOSIS — D64.9 ANEMIA, UNSPECIFIED TYPE: ICD-10-CM

## 2020-01-01 DIAGNOSIS — R53.1 WEAKNESS GENERALIZED: Primary | ICD-10-CM

## 2020-01-01 DIAGNOSIS — G93.41 METABOLIC ENCEPHALOPATHY: Primary | ICD-10-CM

## 2020-01-01 DIAGNOSIS — E87.1 HYPONATREMIA: ICD-10-CM

## 2020-01-01 DIAGNOSIS — M81.0 AGE-RELATED OSTEOPOROSIS WITHOUT CURRENT PATHOLOGICAL FRACTURE: ICD-10-CM

## 2020-01-01 DIAGNOSIS — F10.20 SEVERE ALCOHOL USE DISORDER (HCC): ICD-10-CM

## 2020-01-01 DIAGNOSIS — E78.5 DYSLIPIDEMIA: ICD-10-CM

## 2020-01-01 DIAGNOSIS — R93.89 ABNORMAL CXR: ICD-10-CM

## 2020-01-01 PROCEDURE — 99233 SBSQ HOSP IP/OBS HIGH 50: CPT | Performed by: INTERNAL MEDICINE

## 2020-01-01 PROCEDURE — 99232 SBSQ HOSP IP/OBS MODERATE 35: CPT | Performed by: OTHER

## 2020-01-01 PROCEDURE — 0DB28ZX EXCISION OF MIDDLE ESOPHAGUS, VIA NATURAL OR ARTIFICIAL OPENING ENDOSCOPIC, DIAGNOSTIC: ICD-10-PCS | Performed by: INTERNAL MEDICINE

## 2020-01-01 PROCEDURE — 71045 X-RAY EXAM CHEST 1 VIEW: CPT | Performed by: INTERNAL MEDICINE

## 2020-01-01 PROCEDURE — 95718 EEG PHYS/QHP 2-12 HR W/VEEG: CPT | Performed by: OTHER

## 2020-01-01 PROCEDURE — 99231 SBSQ HOSP IP/OBS SF/LOW 25: CPT | Performed by: HOSPITALIST

## 2020-01-01 PROCEDURE — 99233 SBSQ HOSP IP/OBS HIGH 50: CPT | Performed by: OTHER

## 2020-01-01 PROCEDURE — 99232 SBSQ HOSP IP/OBS MODERATE 35: CPT | Performed by: HOSPITALIST

## 2020-01-01 PROCEDURE — 74177 CT ABD & PELVIS W/CONTRAST: CPT | Performed by: EMERGENCY MEDICINE

## 2020-01-01 PROCEDURE — 99223 1ST HOSP IP/OBS HIGH 75: CPT | Performed by: HOSPITALIST

## 2020-01-01 PROCEDURE — 99306 1ST NF CARE HIGH MDM 50: CPT | Performed by: FAMILY MEDICINE

## 2020-01-01 PROCEDURE — 02HV33Z INSERTION OF INFUSION DEVICE INTO SUPERIOR VENA CAVA, PERCUTANEOUS APPROACH: ICD-10-PCS | Performed by: HOSPITALIST

## 2020-01-01 PROCEDURE — 71045 X-RAY EXAM CHEST 1 VIEW: CPT | Performed by: HOSPITALIST

## 2020-01-01 PROCEDURE — 71045 X-RAY EXAM CHEST 1 VIEW: CPT | Performed by: PHYSICIAN ASSISTANT

## 2020-01-01 PROCEDURE — 99233 SBSQ HOSP IP/OBS HIGH 50: CPT | Performed by: HOSPITALIST

## 2020-01-01 PROCEDURE — 99232 SBSQ HOSP IP/OBS MODERATE 35: CPT | Performed by: NURSE PRACTITIONER

## 2020-01-01 PROCEDURE — 70450 CT HEAD/BRAIN W/O DYE: CPT | Performed by: EMERGENCY MEDICINE

## 2020-01-01 PROCEDURE — 99291 CRITICAL CARE FIRST HOUR: CPT | Performed by: OTHER

## 2020-01-01 PROCEDURE — 99291 CRITICAL CARE FIRST HOUR: CPT | Performed by: HOSPITALIST

## 2020-01-01 PROCEDURE — 71045 X-RAY EXAM CHEST 1 VIEW: CPT | Performed by: RADIOLOGY

## 2020-01-01 PROCEDURE — 70450 CT HEAD/BRAIN W/O DYE: CPT | Performed by: HOSPITALIST

## 2020-01-01 PROCEDURE — 99255 IP/OBS CONSLTJ NEW/EST HI 80: CPT | Performed by: NURSE PRACTITIONER

## 2020-01-01 PROCEDURE — 99356 PROLONGED SERV,INPATIENT,1ST HR: CPT | Performed by: HOSPITALIST

## 2020-01-01 PROCEDURE — 71260 CT THORAX DX C+: CPT | Performed by: EMERGENCY MEDICINE

## 2020-01-01 PROCEDURE — 0W993ZZ DRAINAGE OF RIGHT PLEURAL CAVITY, PERCUTANEOUS APPROACH: ICD-10-PCS | Performed by: RADIOLOGY

## 2020-01-01 PROCEDURE — 99254 IP/OBS CNSLTJ NEW/EST MOD 60: CPT | Performed by: OTHER

## 2020-01-01 PROCEDURE — 0DH67UZ INSERTION OF FEEDING DEVICE INTO STOMACH, VIA NATURAL OR ARTIFICIAL OPENING: ICD-10-PCS | Performed by: HOSPITALIST

## 2020-01-01 PROCEDURE — 30233N1 TRANSFUSION OF NONAUTOLOGOUS RED BLOOD CELLS INTO PERIPHERAL VEIN, PERCUTANEOUS APPROACH: ICD-10-PCS | Performed by: HOSPITALIST

## 2020-01-01 PROCEDURE — 74230 X-RAY XM SWLNG FUNCJ C+: CPT | Performed by: INTERNAL MEDICINE

## 2020-01-01 PROCEDURE — 1111F DSCHRG MED/CURRENT MED MERGE: CPT | Performed by: FAMILY MEDICINE

## 2020-01-01 PROCEDURE — 71045 X-RAY EXAM CHEST 1 VIEW: CPT | Performed by: EMERGENCY MEDICINE

## 2020-01-01 PROCEDURE — 99233 SBSQ HOSP IP/OBS HIGH 50: CPT | Performed by: NURSE PRACTITIONER

## 2020-01-01 PROCEDURE — 99255 IP/OBS CONSLTJ NEW/EST HI 80: CPT | Performed by: SPECIALIST

## 2020-01-01 PROCEDURE — 99239 HOSP IP/OBS DSCHRG MGMT >30: CPT | Performed by: HOSPITALIST

## 2020-01-01 PROCEDURE — 99291 CRITICAL CARE FIRST HOUR: CPT | Performed by: INTERNAL MEDICINE

## 2020-01-01 PROCEDURE — 99232 SBSQ HOSP IP/OBS MODERATE 35: CPT | Performed by: INTERNAL MEDICINE

## 2020-01-01 PROCEDURE — 3E033XZ INTRODUCTION OF VASOPRESSOR INTO PERIPHERAL VEIN, PERCUTANEOUS APPROACH: ICD-10-PCS | Performed by: INTERNAL MEDICINE

## 2020-01-01 PROCEDURE — 99233 SBSQ HOSP IP/OBS HIGH 50: CPT | Performed by: SPECIALIST

## 2020-01-01 PROCEDURE — B548ZZA ULTRASONOGRAPHY OF SUPERIOR VENA CAVA, GUIDANCE: ICD-10-PCS | Performed by: HOSPITALIST

## 2020-01-01 PROCEDURE — 90792 PSYCH DIAG EVAL W/MED SRVCS: CPT | Performed by: OTHER

## 2020-01-01 PROCEDURE — 32555 ASPIRATE PLEURA W/ IMAGING: CPT | Performed by: INTERNAL MEDICINE

## 2020-01-01 RX ORDER — SODIUM CHLORIDE 9 MG/ML
INJECTION, SOLUTION INTRAVENOUS CONTINUOUS
Status: DISCONTINUED | OUTPATIENT
Start: 2020-01-01 | End: 2020-01-01

## 2020-01-01 RX ORDER — FUROSEMIDE 10 MG/ML
40 INJECTION INTRAMUSCULAR; INTRAVENOUS EVERY 8 HOURS PRN
Status: DISCONTINUED | OUTPATIENT
Start: 2020-01-01 | End: 2020-01-01

## 2020-01-01 RX ORDER — FUROSEMIDE 10 MG/ML
20 INJECTION INTRAMUSCULAR; INTRAVENOUS ONCE
Status: DISCONTINUED | OUTPATIENT
Start: 2020-01-01 | End: 2020-01-01

## 2020-01-01 RX ORDER — METOPROLOL TARTRATE 5 MG/5ML
5 INJECTION INTRAVENOUS EVERY 6 HOURS
Status: DISCONTINUED | OUTPATIENT
Start: 2020-01-01 | End: 2020-01-01

## 2020-01-01 RX ORDER — NYSTATIN 100000 U/G
CREAM TOPICAL 3 TIMES DAILY
Status: DISCONTINUED | OUTPATIENT
Start: 2020-01-01 | End: 2020-01-01

## 2020-01-01 RX ORDER — MAGNESIUM SULFATE HEPTAHYDRATE 40 MG/ML
2 INJECTION, SOLUTION INTRAVENOUS ONCE
Status: DISCONTINUED | OUTPATIENT
Start: 2020-01-01 | End: 2020-01-01

## 2020-01-01 RX ORDER — SENNOSIDES 8.6 MG
8.6 TABLET ORAL 2 TIMES DAILY
Status: DISCONTINUED | OUTPATIENT
Start: 2020-01-01 | End: 2020-01-01

## 2020-01-01 RX ORDER — FOLIC ACID 5 MG/ML
1 INJECTION, SOLUTION INTRAMUSCULAR; INTRAVENOUS; SUBCUTANEOUS DAILY
Status: COMPLETED | OUTPATIENT
Start: 2020-01-01 | End: 2020-01-01

## 2020-01-01 RX ORDER — POTASSIUM CHLORIDE 14.9 MG/ML
20 INJECTION INTRAVENOUS ONCE
Status: COMPLETED | OUTPATIENT
Start: 2020-01-01 | End: 2020-01-01

## 2020-01-01 RX ORDER — POTASSIUM CHLORIDE 1.5 G/1.77G
40 POWDER, FOR SOLUTION ORAL 2 TIMES DAILY
Status: DISCONTINUED | OUTPATIENT
Start: 2020-01-01 | End: 2020-01-01

## 2020-01-01 RX ORDER — SCOLOPAMINE TRANSDERMAL SYSTEM 1 MG/1
1 PATCH, EXTENDED RELEASE TRANSDERMAL
Status: DISCONTINUED | OUTPATIENT
Start: 2020-01-01 | End: 2020-01-01

## 2020-01-01 RX ORDER — FUROSEMIDE 10 MG/ML
40 INJECTION INTRAMUSCULAR; INTRAVENOUS ONCE
Status: COMPLETED | OUTPATIENT
Start: 2020-01-01 | End: 2020-01-01

## 2020-01-01 RX ORDER — MELATONIN
100 DAILY
Status: DISCONTINUED | OUTPATIENT
Start: 2020-01-01 | End: 2020-01-01

## 2020-01-01 RX ORDER — SODIUM CHLORIDE 9 MG/ML
INJECTION, SOLUTION INTRAVENOUS CONTINUOUS
Status: ACTIVE | OUTPATIENT
Start: 2020-01-01 | End: 2020-01-01

## 2020-01-01 RX ORDER — SENNA PLUS 8.6 MG/1
8.6 TABLET ORAL 2 TIMES DAILY
Qty: 60 TABLET | Refills: 0 | Status: ON HOLD | OUTPATIENT
Start: 2020-01-01 | End: 2020-01-01

## 2020-01-01 RX ORDER — HALOPERIDOL 5 MG/ML
2 INJECTION INTRAMUSCULAR
Status: DISCONTINUED | OUTPATIENT
Start: 2020-01-01 | End: 2020-01-01

## 2020-01-01 RX ORDER — HALOPERIDOL 5 MG/ML
1 INJECTION INTRAMUSCULAR
Status: DISCONTINUED | OUTPATIENT
Start: 2020-01-01 | End: 2020-01-01

## 2020-01-01 RX ORDER — ENOXAPARIN SODIUM 100 MG/ML
40 INJECTION SUBCUTANEOUS DAILY
Status: DISCONTINUED | OUTPATIENT
Start: 2020-01-01 | End: 2020-01-01

## 2020-01-01 RX ORDER — MORPHINE SULFATE 4 MG/ML
1 INJECTION, SOLUTION INTRAMUSCULAR; INTRAVENOUS
Status: DISCONTINUED | OUTPATIENT
Start: 2020-01-01 | End: 2020-01-01

## 2020-01-01 RX ORDER — ACETAMINOPHEN 325 MG/1
650 TABLET ORAL ONCE
Status: DISCONTINUED | OUTPATIENT
Start: 2020-01-01 | End: 2020-01-01

## 2020-01-01 RX ORDER — METOCLOPRAMIDE HYDROCHLORIDE 5 MG/ML
10 INJECTION INTRAMUSCULAR; INTRAVENOUS EVERY 8 HOURS PRN
Status: DISCONTINUED | OUTPATIENT
Start: 2020-01-01 | End: 2020-01-01

## 2020-01-01 RX ORDER — PANTOPRAZOLE SODIUM 40 MG/1
40 TABLET, DELAYED RELEASE ORAL
Qty: 90 TABLET | Refills: 1 | Status: ON HOLD | OUTPATIENT
Start: 2020-01-01 | End: 2020-01-01

## 2020-01-01 RX ORDER — POTASSIUM CHLORIDE 20 MEQ/1
40 TABLET, EXTENDED RELEASE ORAL EVERY 4 HOURS
Status: COMPLETED | OUTPATIENT
Start: 2020-01-01 | End: 2020-01-01

## 2020-01-01 RX ORDER — HYDRALAZINE HYDROCHLORIDE 20 MG/ML
10 INJECTION INTRAMUSCULAR; INTRAVENOUS EVERY 4 HOURS PRN
Status: DISCONTINUED | OUTPATIENT
Start: 2020-01-01 | End: 2020-01-01

## 2020-01-01 RX ORDER — FUROSEMIDE 10 MG/ML
20 INJECTION INTRAMUSCULAR; INTRAVENOUS ONCE
Status: COMPLETED | OUTPATIENT
Start: 2020-01-01 | End: 2020-01-01

## 2020-01-01 RX ORDER — ACETAMINOPHEN 160 MG/5ML
650 SOLUTION ORAL EVERY 4 HOURS PRN
Status: DISCONTINUED | OUTPATIENT
Start: 2020-01-01 | End: 2020-01-01

## 2020-01-01 RX ORDER — FUROSEMIDE 40 MG/1
40 TABLET ORAL EVERY 8 HOURS PRN
Status: DISCONTINUED | OUTPATIENT
Start: 2020-01-01 | End: 2020-01-01

## 2020-01-01 RX ORDER — ACETAMINOPHEN 325 MG/1
650 TABLET ORAL EVERY 6 HOURS PRN
Status: DISCONTINUED | OUTPATIENT
Start: 2020-01-01 | End: 2020-01-01

## 2020-01-01 RX ORDER — LORAZEPAM 2 MG/ML
1 INJECTION INTRAMUSCULAR EVERY 4 HOURS PRN
Status: DISCONTINUED | OUTPATIENT
Start: 2020-01-01 | End: 2020-01-01

## 2020-01-01 RX ORDER — FOLIC ACID 1 MG/1
1 TABLET ORAL DAILY
Status: DISCONTINUED | OUTPATIENT
Start: 2020-01-01 | End: 2020-01-01

## 2020-01-01 RX ORDER — ACETAMINOPHEN 650 MG/1
650 SUPPOSITORY RECTAL EVERY 6 HOURS PRN
Status: DISCONTINUED | OUTPATIENT
Start: 2020-01-01 | End: 2020-01-01

## 2020-01-01 RX ORDER — SODIUM CHLORIDE, SODIUM LACTATE, POTASSIUM CHLORIDE, CALCIUM CHLORIDE 600; 310; 30; 20 MG/100ML; MG/100ML; MG/100ML; MG/100ML
INJECTION, SOLUTION INTRAVENOUS CONTINUOUS
Status: DISCONTINUED | OUTPATIENT
Start: 2020-01-01 | End: 2020-01-01

## 2020-01-01 RX ORDER — LORAZEPAM 2 MG/ML
2 INJECTION INTRAMUSCULAR
Status: DISCONTINUED | OUTPATIENT
Start: 2020-01-01 | End: 2020-01-01

## 2020-01-01 RX ORDER — POTASSIUM CHLORIDE 29.8 MG/ML
40 INJECTION INTRAVENOUS ONCE
Status: COMPLETED | OUTPATIENT
Start: 2020-01-01 | End: 2020-01-01

## 2020-01-01 RX ORDER — SODIUM CHLORIDE 9 MG/ML
INJECTION, SOLUTION INTRAVENOUS ONCE
Status: COMPLETED | OUTPATIENT
Start: 2020-01-01 | End: 2020-01-01

## 2020-01-01 RX ORDER — MAGNESIUM SULFATE HEPTAHYDRATE 40 MG/ML
2 INJECTION, SOLUTION INTRAVENOUS ONCE
Status: COMPLETED | OUTPATIENT
Start: 2020-01-01 | End: 2020-01-01

## 2020-01-01 RX ORDER — POTASSIUM CHLORIDE 1.5 G/1.77G
40 POWDER, FOR SOLUTION ORAL EVERY 4 HOURS
Status: DISCONTINUED | OUTPATIENT
Start: 2020-01-01 | End: 2020-01-01

## 2020-01-01 RX ORDER — LORAZEPAM 1 MG/1
1 TABLET ORAL
Status: DISCONTINUED | OUTPATIENT
Start: 2020-01-01 | End: 2020-01-01

## 2020-01-01 RX ORDER — POTASSIUM CHLORIDE 20 MEQ/1
40 TABLET, EXTENDED RELEASE ORAL ONCE
Status: COMPLETED | OUTPATIENT
Start: 2020-01-01 | End: 2020-01-01

## 2020-01-01 RX ORDER — MAGNESIUM OXIDE 400 MG (241.3 MG MAGNESIUM) TABLET
800 TABLET ONCE
Status: COMPLETED | OUTPATIENT
Start: 2020-01-01 | End: 2020-01-01

## 2020-01-01 RX ORDER — MAGNESIUM OXIDE 400 MG (241.3 MG MAGNESIUM) TABLET
400 TABLET ONCE
Status: COMPLETED | OUTPATIENT
Start: 2020-01-01 | End: 2020-01-01

## 2020-01-01 RX ORDER — GLYCOPYRROLATE 0.2 MG/ML
0.4 INJECTION, SOLUTION INTRAMUSCULAR; INTRAVENOUS
Status: DISCONTINUED | OUTPATIENT
Start: 2020-01-01 | End: 2020-01-01

## 2020-01-01 RX ORDER — ONDANSETRON 2 MG/ML
4 INJECTION INTRAMUSCULAR; INTRAVENOUS EVERY 6 HOURS PRN
Status: DISCONTINUED | OUTPATIENT
Start: 2020-01-01 | End: 2020-01-01

## 2020-01-01 RX ORDER — DOCUSATE SODIUM 100 MG/1
100 CAPSULE, LIQUID FILLED ORAL 2 TIMES DAILY
Status: DISCONTINUED | OUTPATIENT
Start: 2020-01-01 | End: 2020-01-01

## 2020-01-01 RX ORDER — FOLIC ACID 1 MG/1
1 TABLET ORAL DAILY
Qty: 30 TABLET | Refills: 0 | Status: ON HOLD | OUTPATIENT
Start: 2020-01-01 | End: 2020-01-01

## 2020-01-01 RX ORDER — IPRATROPIUM BROMIDE AND ALBUTEROL SULFATE 2.5; .5 MG/3ML; MG/3ML
3 SOLUTION RESPIRATORY (INHALATION)
Refills: 0 | Status: ON HOLD | COMMUNITY
Start: 2020-01-01 | End: 2020-01-01

## 2020-01-01 RX ORDER — POTASSIUM CHLORIDE 14.9 MG/ML
20 INJECTION INTRAVENOUS ONCE
Status: DISCONTINUED | OUTPATIENT
Start: 2020-01-01 | End: 2020-01-01

## 2020-01-01 RX ORDER — POTASSIUM CHLORIDE 1.5 G/1.77G
40 POWDER, FOR SOLUTION ORAL ONCE
Status: COMPLETED | OUTPATIENT
Start: 2020-01-01 | End: 2020-01-01

## 2020-01-01 RX ORDER — FOLIC ACID 1 MG/1
5 TABLET ORAL DAILY
Status: DISCONTINUED | OUTPATIENT
Start: 2020-01-01 | End: 2020-01-01

## 2020-01-01 RX ORDER — ACETAMINOPHEN 325 MG/1
650 TABLET ORAL EVERY 6 HOURS PRN
Status: DISCONTINUED | OUTPATIENT
Start: 2020-01-01 | End: 2020-01-01 | Stop reason: SDUPTHER

## 2020-01-01 RX ORDER — PANTOPRAZOLE SODIUM 40 MG/1
40 TABLET, DELAYED RELEASE ORAL
Status: DISCONTINUED | OUTPATIENT
Start: 2020-01-01 | End: 2020-01-01

## 2020-01-01 RX ORDER — CEFDINIR 300 MG/1
300 CAPSULE ORAL 2 TIMES DAILY
Qty: 10 CAPSULE | Refills: 0 | Status: SHIPPED | OUTPATIENT
Start: 2020-01-01 | End: 2020-01-01

## 2020-01-01 RX ORDER — HYDRALAZINE HYDROCHLORIDE 20 MG/ML
10 INJECTION INTRAMUSCULAR; INTRAVENOUS ONCE
Status: COMPLETED | OUTPATIENT
Start: 2020-01-01 | End: 2020-01-01

## 2020-01-01 RX ORDER — IPRATROPIUM BROMIDE AND ALBUTEROL SULFATE 2.5; .5 MG/3ML; MG/3ML
3 SOLUTION RESPIRATORY (INHALATION)
Status: DISCONTINUED | OUTPATIENT
Start: 2020-01-01 | End: 2020-01-01

## 2020-01-01 RX ORDER — FOLIC ACID 1 MG/1
1 TABLET ORAL DAILY
Status: DISCONTINUED | OUTPATIENT
Start: 2020-01-01 | End: 2020-01-01 | Stop reason: SDUPTHER

## 2020-01-01 RX ORDER — LORAZEPAM 2 MG/ML
1 INJECTION INTRAMUSCULAR
Status: DISCONTINUED | OUTPATIENT
Start: 2020-01-01 | End: 2020-01-01

## 2020-01-01 RX ORDER — ONDANSETRON 4 MG/1
4 TABLET, ORALLY DISINTEGRATING ORAL EVERY 6 HOURS PRN
Status: DISCONTINUED | OUTPATIENT
Start: 2020-01-01 | End: 2020-01-01

## 2020-01-01 RX ORDER — NYSTATIN 100000 U/G
1 CREAM TOPICAL AS NEEDED
Status: DISCONTINUED | OUTPATIENT
Start: 2020-01-01 | End: 2020-01-01

## 2020-01-01 RX ORDER — ACETAMINOPHEN 650 MG/1
650 SUPPOSITORY RECTAL EVERY 4 HOURS PRN
Status: DISCONTINUED | OUTPATIENT
Start: 2020-01-01 | End: 2020-01-01

## 2020-01-01 RX ORDER — LORAZEPAM 2 MG/ML
2 INJECTION INTRAMUSCULAR ONCE
Status: COMPLETED | OUTPATIENT
Start: 2020-01-01 | End: 2020-01-01

## 2020-01-01 RX ORDER — HALOPERIDOL 5 MG/ML
3 INJECTION INTRAMUSCULAR ONCE
Status: COMPLETED | OUTPATIENT
Start: 2020-01-01 | End: 2020-01-01

## 2020-01-01 RX ORDER — KETOROLAC TROMETHAMINE 15 MG/ML
15 INJECTION, SOLUTION INTRAMUSCULAR; INTRAVENOUS EVERY 6 HOURS PRN
Status: DISPENSED | OUTPATIENT
Start: 2020-01-01 | End: 2020-01-01

## 2020-01-01 RX ORDER — ACETAMINOPHEN 160 MG/5ML
650 SOLUTION ORAL EVERY 6 HOURS PRN
Status: DISCONTINUED | OUTPATIENT
Start: 2020-01-01 | End: 2020-01-01

## 2020-01-01 RX ORDER — BISACODYL 10 MG
10 SUPPOSITORY, RECTAL RECTAL
Status: DISCONTINUED | OUTPATIENT
Start: 2020-01-01 | End: 2020-01-01

## 2020-01-01 RX ORDER — ATROPINE SULFATE 10 MG/ML
2 SOLUTION/ DROPS OPHTHALMIC EVERY 2 HOUR PRN
Status: DISCONTINUED | OUTPATIENT
Start: 2020-01-01 | End: 2020-01-01

## 2020-01-01 RX ORDER — LORAZEPAM 2 MG/ML
0.5 INJECTION INTRAMUSCULAR EVERY 4 HOURS PRN
Status: DISCONTINUED | OUTPATIENT
Start: 2020-01-01 | End: 2020-01-01

## 2020-01-01 RX ORDER — ONDANSETRON 2 MG/ML
4 INJECTION INTRAMUSCULAR; INTRAVENOUS EVERY 4 HOURS PRN
Status: DISCONTINUED | OUTPATIENT
Start: 2020-01-01 | End: 2020-01-01

## 2020-01-01 RX ORDER — LORAZEPAM 1 MG/1
2 TABLET ORAL
Status: DISCONTINUED | OUTPATIENT
Start: 2020-01-01 | End: 2020-01-01

## 2020-01-01 RX ORDER — MORPHINE SULFATE 20 MG/ML
10 SOLUTION ORAL
Status: DISCONTINUED | OUTPATIENT
Start: 2020-01-01 | End: 2020-01-01

## 2020-01-01 RX ORDER — LEVOTHYROXINE SODIUM 0.03 MG/1
25 TABLET ORAL
Status: DISCONTINUED | OUTPATIENT
Start: 2020-01-01 | End: 2020-01-01

## 2020-01-01 RX ORDER — MORPHINE SULFATE 20 MG/ML
10 SOLUTION ORAL EVERY 4 HOURS
Status: DISCONTINUED | OUTPATIENT
Start: 2020-01-01 | End: 2020-01-01

## 2020-01-01 RX ORDER — AMOXICILLIN AND CLAVULANATE POTASSIUM 875; 125 MG/1; MG/1
1 TABLET, FILM COATED ORAL EVERY 12 HOURS SCHEDULED
Status: DISPENSED | OUTPATIENT
Start: 2020-01-01 | End: 2020-01-01

## 2020-01-01 RX ORDER — NYSTATIN 100000 U/G
1 CREAM TOPICAL AS NEEDED
Qty: 1 TUBE | Refills: 0 | Status: SHIPPED | OUTPATIENT
Start: 2020-01-01 | End: 2020-01-01

## 2020-01-01 RX ORDER — LOPERAMIDE HCL 1 MG/7.5ML
2 SOLUTION ORAL 4 TIMES DAILY PRN
Status: DISCONTINUED | OUTPATIENT
Start: 2020-01-01 | End: 2020-01-01

## 2020-01-01 RX ORDER — LORAZEPAM 2 MG/ML
2 INJECTION INTRAMUSCULAR EVERY 4 HOURS PRN
Status: DISCONTINUED | OUTPATIENT
Start: 2020-01-01 | End: 2020-01-01

## 2020-01-01 RX ORDER — LACTULOSE 10 G/15ML
10 SOLUTION ORAL ONCE
Status: COMPLETED | OUTPATIENT
Start: 2020-01-01 | End: 2020-01-01

## 2020-05-26 PROBLEM — R53.1 WEAKNESS GENERALIZED: Status: ACTIVE | Noted: 2020-01-01

## 2020-05-26 PROBLEM — R93.89 ABNORMAL CXR: Status: ACTIVE | Noted: 2020-01-01

## 2020-05-26 PROBLEM — D64.9 ANEMIA: Status: ACTIVE | Noted: 2020-01-01

## 2020-05-26 PROBLEM — D64.9 ANEMIA, UNSPECIFIED TYPE: Status: ACTIVE | Noted: 2020-01-01

## 2020-05-26 NOTE — ED PROVIDER NOTES
Patient Seen in: [facility] Emergency Department      History   Patient presents with:  Fatigue    Stated Complaint: fatigue x 1 week    HPI    Patient is a pleasant 66-year-old female smoker, presenting for evaluation of progressive weakness.     Fa 107/58   Pulse 102   Temp 98 °F (36.7 °C) (Temporal)   Resp 22   Ht 156.2 cm (5' 1.5\")   Wt 52.2 kg   SpO2 95%   BMI 21.38 kg/m²       Physical Exam    Vital signs noted. GENERAL: Patient is awake and alert, supine on the cart, in no apparent distress. components within normal limits   VITAMIN B12 - Abnormal; Notable for the following components:    Vitamin B12 >2,000 (*)     All other components within normal limits   FOLIC ACID SERUM(FOLATE) - Abnormal; Notable for the following components:    Folate ( Final result               ANTIBODY SCREEN[[id number]]                                  Final result                 Please view results for these tests on the individual orders.    ABORH (BLOOD TYPE)   ANTIBODY SCREEN   RAINBOW DRAW BLUE   VARSHA LOWERY SKULL:  No evidence for fracture or acute osseous abnormality. OTHER:  None. CONCLUSION:  Abnormal appearance of the mastoid air cells and middle ear cavity on the right side, with these normally aerated structures completely opacified with fluid.   Susp further evaluation. The patient was attached to a cardiac monitor. An EKG was obtained and reviewed as noted above. Patient was observed while the laboratory and radiology studies were obtained. Marked laboratory abnormalities were noted.   Patient is gu

## 2020-05-26 NOTE — TELEPHONE ENCOUNTER
Spoke to Dr Dominique Ch regarding message from weekend as well as reviewed most recent call.   Agree, needs ER eval and to call 911

## 2020-05-26 NOTE — TELEPHONE ENCOUNTER
Patient's  paged on-call regarding patient. He notes a two-week history of a gradually worsening confusion, weakness, and falls. The patient has not been eating and drinking. She has been falling onto the floor and into walls.  She has declined me

## 2020-05-26 NOTE — TELEPHONE ENCOUNTER
Spoke with .  states wife cannot walk more than 4 feet without falling.  reports her falling into the walls and furniture.  states she is not eating or drinking, and saying things that do not make sense.   is concerned of

## 2020-05-26 NOTE — H&P
[facility]  History and Physical     Danielsaw Graff Patient Status:  Emergency    1955 MRN [id number]   Location [address] [facility] Attending Chay Ruby MD   Hosp Day # 0 PCP YOANA Brown     Chief Complain esophogeal    • Cancer Paternal Grandmother 48        colon   • Colon Cancer Paternal Grandmother      Allergies: No Known Allergies  Medications:  No current facility-administered medications on file prior to encounter.    Tiotropium Bromide-Olodate consult  2. Replace folate  2. Hypokalemia with hypomagnesemia  1. Replace Mg++ then K+  3. Hyponatremia- IVF  4. Hypocalcemia- corrected to 8.3 with hypoalbuminemia   5. COPD- sees Dr. Skyler Delgado IV- consult  6. Nicotine dependence-   7.  Macrocytic anemia

## 2020-05-26 NOTE — TELEPHONE ENCOUNTER
Pt's  calling back, states he is at a loss as to what he can do for his wife.  can't get her to go the ED at [facility]. If he calls an ambulance they will take her to [facility],  is Susu Dhaliwal of having her taken there.      would lik

## 2020-05-27 NOTE — PLAN OF CARE
Pressure noted on both buttocks, Dr. Reyna Reich notified. Dr. Verner Nao notifed regarding consult. Spoke with Dr. Reyna Reich and Dr. Damon Gregory regarding Hemoglobin 6.3.

## 2020-05-27 NOTE — PROGRESS NOTES
[facility]  Progress Note     Fawn Mathew Patient Status:  Inpatient    1955 MRN [id number]   [facility] 3NE-A Attending Rukhsana Ramos MD   Hosp Day # 1 PCP YOANA Villalpando     Chief Complaint: weakness   S:  Feels Imaging data reviewed in Epic.   Medications:   • iron sucrose  200 mg Intravenous Daily   • potassium chloride  40 mEq Oral Once   • magnesium oxide  800 mg Oral Once   • folic acid  1 mg Oral Daily   • umeclidinium-vilanterol  1 puff Inhalation Daily   •

## 2020-05-27 NOTE — CM/SW NOTE
1:21pm  MSW spoke to patient, she is not sure 100% but leaning towards rehab. DON req. Referrals made and MSW will provide patient a list of options once a place agrees to accept her. However MSW will need PT note to make referrals.

## 2020-05-27 NOTE — CONSULTS
Pulmonary H&P/Consult     NAME: [address] [place] [address] Ne: [id number] - MRN: [id number] - Age: 59year old - :  1955    Date of Admission: 2020 11:21 AM  Admission Diagnosis: Hypokalemia [E87.6]  Hypomagnesemia [E83.42]  Folate deficiency [E53. keeping up with fluids   would have to pick her up and transport her in the home with a wheelchair. Then she started to get confused and a little disoriented.    Her  does relate that the patient will often downplay her symptoms and tell ever [address] [facility] filed at 5/26/2020 1313  Gross per 24 hour   Intake 1000 ml   Output —   Net 1000 ml     /85 (BP Location: Left arm)   Pulse 94   Temp 97.9 °F (36.6 °C) (Oral)   Resp 20   Ht 5' 1.5\" (1.562 m)   Wt 115 lb (52.2 kg)   SpO2 95%   BMI 2

## 2020-05-27 NOTE — PLAN OF CARE
A/O x 4 with noted moments of confusion. Very poor appetite. Order for dietician to see. Redness to bottom and groin area, cleansed and barrier cream applied. Up to chair for breakfast.  Awaiting PT eval.  Electrolytes replaced. IV fluids.  Updated pat

## 2020-05-27 NOTE — OCCUPATIONAL THERAPY NOTE
OCCUPATIONAL THERAPY EVALUATION - INPATIENT     Room Number: 2100/8075-W  Evaluation Date: 5/27/2020  Type of Evaluation: Initial  Presenting Problem: weakness generalized    Physician Order: IP Consult to Occupational Therapy  Reason for Therapy: ADL/IADL MD [facility] at [facility]  Type of Home: House  Home Layout: Multi-level(able to live on main )  Lives With: Spouse    Toilet and Equipment: Standard height toilet  Shower/Tub and Equipment: Tub-shower combo washing, rinsing, drying)?: A Lot  -   Toileting, which includes using toilet, bedpan or urinal? : A Lot  -   Putting on and taking off regular upper body clothing?: A Lot  -   Taking care of personal grooming such as brushing teeth?: A Little  -   Eating should achieve PLOF level in BADLs and functional transfers.     The patient is functioning below her previous functional level and would benefit from skilled inpatient OT to address the above deficits, maximizing patient’s ability to return safely to her p standing for 3 minutes utilizing AD as needed to perform grooming ADLs at sink level.

## 2020-05-27 NOTE — CONSULTS
[facility]                       Gastroenterology [address] [facility] Gastroenterology    [place] Patient Status:  Inpatient    1955 MRN [id number]   [facility] 3NE-A Attending Mazin Billings MD   [facility] Daily  umeclidinium-vilanterol (ANORO ELLIPTA) 62.5-25 MCG/INH inhaler 1 puff, 1 puff, Inhalation, Daily  0.9% NaCl infusion, , Intravenous, Continuous  acetaminophen (TYLENOL) tab 650 mg, 650 mg, Oral, Q6H PRN  ondansetron HCl (ZOFRAN) injection 4 mg, 4 m reports no hoarseness of voice, hearing loss, sinus congestion, tinnitus           Neurologic: The patient reports no history of seizure, stroke, or frequent headaches      PE: BP (!) 123/91 (BP Location: Right arm)   Pulse 96   Temp 97.9 °F (36.6 °C) (Ora < > 7.6*   HCT 18.2*  --  22.2*   .0*  --  107.2*   MCH 42.6*  --  36.7*   MCHC 34.6  --  34.2   RDW 14.3  --   --    NEPRELIM 7.35  --  5.13   WBC 9.7  --  6.5   .0  --  189.0    < > = values in this interval not displayed.        Recent La

## 2020-05-27 NOTE — PROGRESS NOTES
NURSING ADMISSION NOTE      Patient admitted via Cart  Oriented to room. Safety precautions initiated. Bed in low position. Call light in reach. Patient admitted from ER. Patient alert, oriented to person, time and situation,  disoriented to place.

## 2020-05-27 NOTE — H&P (VIEW-ONLY)
[facility]                       Gastroenterology [address] [facility] [facility] [address] Gastroenterology    Cyntha Pulling Patient Status:  Inpatient    1955 MRN [id number]   [facility] 3NE-A Attending Cary Castillo MD   [facility] Daily  umeclidinium-vilanterol (ANORO ELLIPTA) 62.5-25 MCG/INH inhaler 1 puff, 1 puff, Inhalation, Daily  0.9% NaCl infusion, , Intravenous, Continuous  acetaminophen (TYLENOL) tab 650 mg, 650 mg, Oral, Q6H PRN  ondansetron HCl (ZOFRAN) injection 4 mg, 4 m reports no hoarseness of voice, hearing loss, sinus congestion, tinnitus           Neurologic: The patient reports no history of seizure, stroke, or frequent headaches      PE: BP (!) 123/91 (BP Location: Right arm)   Pulse 96   Temp 97.9 °F (36.6 °C) (Ora < > 7.6*   HCT 18.2*  --  22.2*   .0*  --  107.2*   MCH 42.6*  --  36.7*   MCHC 34.6  --  34.2   RDW 14.3  --   --    NEPRELIM 7.35  --  5.13   WBC 9.7  --  6.5   .0  --  189.0    < > = values in this interval not displayed.        Recent La

## 2020-05-27 NOTE — PAYOR COMM NOTE
--------------  ADMISSION REVIEW     Payor: JASSI PEREZ  Subscriber #:  [id number]  Authorization Number: [id number]    Admit date: 5/26/20  Admit time: 3414       Patient Seen in: [facility] Emergency Department    History   Patient presents with:   Fa cyanosis, clubbing, or edema. NEUROLOGIC: The patient is awake, alert, and oriented x3. Cranial nerves are grossly intact. There is no gross motor or sensory deficits identified. RECTAL:  Brown stool. Guaiac negative.     Labs Reviewed   COMP METABOLIC Xr Chest Ap Portable  (cpt=71045)    Result Date: 5/26/2020  PROCEDURE:  XR CHEST AP PORTABLE    CONCLUSION:  Developing process in the right chest since the prior exam with diminished volume of the right chest, mediastinal shift to the right and pleur Regular rate. No murmurs, rubs or gallops. Equal pulses. Chest and Back: No tenderness or deformity. Abdomen: Soft, nontender, nondistended. Positive bowel sounds. No rebound or guarding. Neurologic: CNII-XII grossly intact.  No focal neurological defi nondistended. Positive bowel sounds. Musculoskeletal: Moves all extremities. Extremities: No edema. Skin:  Sacral excoriation. No active discharge.               Lab 05/26/20  1136 05/27/20  0110 05/27/20  0802   WBC 9.7  --  6.5   HGB 6.3* 7.1* 7.6* tablet Oral     5/27/2020 0022 Given 1 tablet Oral       folic acid (FOLVITE) tab 1 mg     Date Action Dose Route     5/27/2020 0834 Given 1 mg Oral     5/26/2020 1902 Given 1 mg Oral       iron sucrose (VENOFER) IV Push 200 mg     Date Action Dose Route

## 2020-05-27 NOTE — PLAN OF CARE
A&Ox3-4, has episodes of forgetfulness and confusion-reorientation provided. On room air, lungs diminished with rhonchi. Has dry, non-productive cough. Denies JOSSELYN or SOB. Abdomen soft and nontender, BS active. NSR, ST on tele.  Excoriation/redness to buttoc ABGs  - Provide Smoking Cessation handout, if applicable  - Encourage broncho-pulmonary hygiene including cough, deep breathe, Incentive Spirometry  - Assess the need for suctioning and perform as needed  - Assess and instruct to report SOB or any respirat nutrition restrictions as appropriate  Outcome: Progressing     Problem: SKIN/TISSUE INTEGRITY - ADULT  Goal: Incision(s), wounds(s) or drain site(s) healing without S/S of infection  Description  INTERVENTIONS:  - Assess and document risk factors for pres status  Description  INTERVENTIONS  - Assess for and report changes in neurological status  - Initiate measures to prevent increased intracranial pressure  - Maintain blood pressure and fluid volume within ordered parameters to optimize cerebral perfusion

## 2020-05-27 NOTE — CONSULTS
THE [facility] OF [facility] Hematology Oncology Group Report of Consultation      Patient Name: Vince Oneill   YOB: 1955  Medical Record Number: [id number]  Consulting Physician: Rema Tierney. Torey Monahan M.D.    Referring Physician: Trev Andrade MD    Date of Cons injection 61 mL, 61 mL, Intravenous, ONCE PRN  [COMPLETED] potassium chloride IVPB premix 20 mEq, 20 mEq, Intravenous, Once  folic acid (FOLVITE) tab 1 mg, 1 mg, Oral, Daily  umeclidinium-vilanterol (ANORO ELLIPTA) 62.5-25 MCG/INH inhaler 1 puff, 1 puff, I supraclavicular, axillary lymphadenopathy; bruising under right eye. Respiratory Normal effort; no respiratory distress. Cardiovascular Regular rate and rhythm. Abdomen Soft; non-tender; non-distended; no masses. Extremities No lower extremity edema. 11:36 AM   Result Value Ref Range    Hold Gold Auto Resulted    CBC W/ DIFFERENTIAL    Collection Time: 05/26/20 11:36 AM   Result Value Ref Range    WBC 9.7 4.0 - 11.0 x10(3) uL    RBC 1.48 (L) 3.80 - 5.30 x10(6)uL    HGB 6.3 (LL) 12.0 - 16.0 g/dL    HCT Range    PTT 28.9 25.4 - 36.1 seconds   MAGNESIUM    Collection Time: 05/26/20 11:36 AM   Result Value Ref Range    Magnesium 1.1 (LL) 1.6 - 2.6 mg/dL   VITAMIN B12    Collection Time: 05/26/20 11:36 AM   Result Value Ref Range    Vitamin B12 >2,000 (H) 19 Mucous Urine 1+ (A) None Seen    Yeast Urine None Seen None Seen   RAPID SARS-COV-2 BY PCR    Collection Time: 05/26/20 11:47 AM   Result Value Ref Range    Rapid SARS-CoV-2 by PCR Not Detected Not Detected   EKG 12-LEAD    Collection Time: 05/26/20 11:49 calcification. PLEURA:  Small to moderate right pleural effusion is noted. CHEST WALL:  No mass or axillary adenopathy. AORTA:  No aneurysm or dissection. VASCULATURE:  No visible pulmonary arterial thrombus or attenuation.        ABDOMEN/PELVIS:  L PROCEDURE:  CT BRAIN OR HEAD (59389)     COMPARISON:  [facility] , CT, CT BRAIN OR HEAD (35887), 3/29/2016, 6:11 PM.     INDICATIONS:  fatigue x 1 week     TECHNIQUE:  Noncontrast CT scanning is performed through the brain.  Dose reduction techniques were u shift, mass effect, hydrocephalus or herniation. There has been some increase in the   amount of atrophy since 2016.           Dictated by: Juno Sanchez MD on 5/26/2020 at 2:54 PM       Finalized by: Juno Sanchez MD on 5/26/2020 at 2:56 PM       [facility] the macrocytosis to be chronic and present even when patient was not anemic. The likely cause of her chronic macrocytosis is her underlying liver disease. Her anemia is subacute with normal hemoglobin in 08/2019.  Serum folic acid level is low and

## 2020-05-28 NOTE — PROGRESS NOTES
Pulmonary Progress Note        NAME: Liborio Quick - ROOM: 8850/8711-G - MRN: [id number] - Age: 59year old - : 1955        Last 24hrs: No events overnight, refusing interventions today though on my interview she is agreeable to the EGD and C-Sc that seems more prominent compared to 6 months ago. Also new patchy infiltrates in the RUL as well. Progression appears to be consitent with atypical infection. Not producing sputum. Given smoking hx DONTRELL a possibility but less likely.   Also associated

## 2020-05-28 NOTE — PROGRESS NOTES
THE [facility] Hematology Oncology Group Progress Note      Patient Name: Ashlyn Guzmán   YOB: 1955  Medical Record Number: [id number]    Date of Consultation: 5/26/2020      Shahnaz Dengie  is a 59year old female who presented to ED (TYLENOL) tab 650 mg, 650 mg, Oral, Q6H PRN  ondansetron HCl (ZOFRAN) injection 4 mg, 4 mg, Intravenous, Q6H PRN  Metoclopramide HCl (REGLAN) injection 10 mg, 10 mg, Intravenous, Q8H PRN  [COMPLETED] Potassium Chloride ER (K-DUR M20) CR tab 40 mEq, 40 mEq, BUN/CREA Ratio 5.2 (L) 10.0 - 20.0    Calcium, Total 6.8 (L) 8.5 - 10.1 mg/dL    Calculated Osmolality 284 275 - 295 mOsm/kg    GFR, Non- 98 >=60    GFR, -American 113 >=60   MAGNESIUM    Collection Time: 05/28/20  6:04 AM   Resul

## 2020-05-28 NOTE — PROGRESS NOTES
[facility] Gastroenterology Progress Note    S: Pt did not drink prep last night (only drank less than half) and not with clear stools. Denies gib sx.      O: /86 (BP Location: Left arm)   Pulse 93   Temp 98.4 °F (36.9 °C) (Oral)   Resp 20   Ht 6

## 2020-05-28 NOTE — BH LEVEL OF CARE ASSESSMENT
Level of Care Assessment Note    General Questions  Why are you here?: Patient is not sure why she came into the hospital.    Precipitating Events: Patient was placed on the etoh ciwa protocol due to her history of drinking.   History of Present Illness: Pa Alcohol Use  How often do you have a drink containing alcohol? : 4 or more times per week  Alcohol Use  Age at first use?: 18  Route: Oral  Average amount used? : 3-4 shots of vodka daily  How long with this pattern of use?: She said she is not sure  I Hospitalization  Refused Treatment: Yes  Can an [facility] Staff Call You in 24-72 Hours?: No  Refused Treatment Reason: Declines to Abstain from Substance Use

## 2020-05-28 NOTE — PLAN OF CARE
Alert & confused. Says things that dont make sense at times. Mood is very labile. Anxious at times, angry & intermittently calm.  Refusing colonoscopy and became very angry when asked if she would consent to it, after she told Dr. Bartolome Rodriguez she would do the consumed  - Identify factors contributing to decreased intake, treat as appropriate  - Assist with meals as needed  - Monitor I&O, WT and lab values  - Obtain nutritional consult as needed  - Optimize oral hygiene and moisture  - Encourage food from home; function  Description  INTERVENTIONS:  - Assess patient stability and activity tolerance for standing, transferring and ambulating w/ or w/o assistive devices  - Assist with transfers and ambulation using safe patient handling equipment as needed  - Ensure

## 2020-05-28 NOTE — PLAN OF CARE
Assumed care of pt who reiterated that she is not going to consent to EGD/Colonoscopy. Requesting water & food.  Spoke with RN in endo who has call out to GI

## 2020-05-28 NOTE — PROGRESS NOTES
[facility]  Progress Note     [facility] Patient Status:  Inpatient    1955 MRN [id number]   [facility] 3NE-A Attending Piedad Stanton MD   [address] Day # 2 PCP Y[facility]NA Gandara     Chief Complaint: weakness   S:  Feels <0.045        Imaging: Imaging data reviewed in Epic.   Medications:   • potassium chloride 40mEq IVPB (peripheral line)  40 mEq Intravenous Once    Followed by   • potassium chloride  20 mEq Intravenous Once   • iron sucrose  200 mg Intravenous Daily   • f

## 2020-05-28 NOTE — PROGRESS NOTES
2030: Paged GI as patient is refusing the finish golytely and scope in the am. Dr Leon Henson called back, no new orders.

## 2020-05-28 NOTE — DIETARY MALNUTRITION NOTE
[facility]    NUTRITION INITIAL ASSESSMENT    Pt meets severe malnutrition criteria.     CRITERIA FOR MALNUTRITION DIAGNOSIS:  Criteria for severe malnutrition diagnosis: acute illness/injury and social/environmental reasons related to energy intake le Last 6 Encounters:  05/26/20 : 52.2 kg (115 lb)  03/05/20 : 52.2 kg (115 lb)  10/30/19 : 53.5 kg (118 lb)  08/14/19 : 53.1 kg (117 lb)  08/29/18 : 50.7 kg (111 lb 12.8 oz)  07/24/18 : 48.1 kg (106 lb)      NUTRITION:  Diet: NPO  Oral Supplements: will add

## 2020-05-28 NOTE — PLAN OF CARE
Patient is A&Ox4 - episodes of confusion   Denies any pain or discomfort  NSR on tele - on room air  Up with 1 assist with a walker  IVF infusing  On po augmentin  Asked permission from patient if this writer can talk to her  and she said yes  Sapna Storey ventilation and oxygenation  Description  INTERVENTIONS:  - Assess for changes in respiratory status  - Assess for changes in mentation and behavior  - Position to facilitate oxygenation and minimize respiratory effort  - Oxygen supplementation based on ox serum osmolarity and serum sodium as indicated or ordered  - Monitor response to interventions for patient's volume status, including labs, urine output, blood pressure (other measures as available)  - Encourage oral intake as appropriate  - Instruct patie orders  - Instruct and reinforce with patient and family use of appropriate assistive device and precautions (e.g. spinal or hip dislocation precautions)  Outcome: Progressing     Problem: NEUROLOGICAL - ADULT  Goal: Achieves stable or improved neurologica

## 2020-05-28 NOTE — PHYSICAL THERAPY NOTE
PHYSICAL THERAPY EVALUATION - INPATIENT     Room Number: 7492/2783-N  Evaluation Date: 5/28/2020  Type of Evaluation: Initial  Physician Order: PT Eval and Treat    Presenting Problem: Generalized weakness  Reason for Therapy: Mobility Dysfunction an Vomiting    • Wears glasses        Past Surgical History  Past Surgical History:   Procedure Laterality Date   • OTHER SURGICAL HISTORY  3/31/16    L arm shoulder repair   • OTHER SURGICAL HISTORY  21years old    jaw repair   • SHOULDER OPEN REDUCTION INT tested      AM-PAC '6-Clicks' INPATIENT SHORT FORM - BASIC MOBILITY  How much difficulty does the patient currently have. ..  -   Turning over in bed (including adjusting bedclothes, sheets and blankets)?: None   -   Sitting down on and standing up from a c impairments limited motivation to participate in mobility, cachectic appearance, weakness b le's, decreased balance. Functional outcome measures completed include AMPAC.   Based on this evaluation, patient's clinical presentation is evolving and overall th

## 2020-05-28 NOTE — CONSULTS
[facility]  Report of Inpatient Wound Care Consultation     Elijahvidya Grigsby Patient Status:  Inpatient    1955 MRN [id number]   [facility] 3NE-A Attending Yadira Oliver MD   Hosp Day # 2 PCP YOANA Turner     REASON FOR --  104* 86   CA 6.7*  --   --  6.9* 6.8*   ALB 2.0*  --   --  1.9*  --    TP 5.5*  --   --  5.1*  --    PTT 28.9  --   --   --   --    INR 1.24*  --   --   --   --    B12 >2,000*  --   --   --   --    ETOH <3  --   --   --   --          ASSESSMENT/ RECOM

## 2020-05-28 NOTE — CM/SW NOTE
11am  MSW spoke to pt's spouse and discussed dc plan. Referrals not sent because PT rec is pending. Message sent to PT for note. 1:33pm  Referrals sent to [facility], pt and spouse to make choice when providers respond.

## 2020-05-28 NOTE — PLAN OF CARE
Received consent from pts  for thoracentesis.  does not want pt to know he consented d/t she will then stop him from getting information. Spoke with pt who at this time is ok with getting procedure,although she continues to ask for food.  LAINEY

## 2020-05-28 NOTE — PLAN OF CARE
Pt is up in chair. Returned from thoracentesis. Pt got angry when I asked if she would drink the [facility] for the colonoscopy tomorrow. She told Pulm  she would cooperate with the test. Adamantly refused colonoscopy/endoscopy. GI  aware.

## 2020-05-29 NOTE — CDS QUERY
Potential for Impaired Nutritional Status  DOCUMENTATION CLARIFICATION FORM  Clinical information suggests potential for impaired nutritional status.  For accurate ICD-10-CM code assignment to reflect severity of illness and risk of mortality,  PLEASE (X) A COPD, ETOH abuse and pressure ulcers. RD attempted speak with her but pt asked for interview to be cut short. She did say she has had less than 1 meal a day over the past 2 weeks.  She felt the reason was because she did not have the appropriate food in t nutrition risk     FOLLOW-UP DATE: 6/1     Caroline Cabrera RDN  Clinical Dietitian  Pager- [phone]     Cosigned by: Ashia Pond MD at 5/28/2020  2:51 PM  Revision History        THIS FORM IS A PERMANENT PART OF THE MEDICAL RECORD

## 2020-05-29 NOTE — PROGRESS NOTES
[facility]  Progress Note     Fortinomichael WorthyRadha Patient Status:  Inpatient    1955 MRN [id number]   [facility] 3NE-A Attending Triny Gould MD   [facility] Day # 3 PCP YOANA Guzman     Chief Complaint: Weakness   S:  No CP not displayed. Estimated Creatinine Clearance: 75.6 mL/min (based on SCr of 0.58 mg/dL).   Recent Labs   Lab 05/26/20  1136 05/28/20  1134   PTP 16.0* 17.9*   INR 1.24* 1.43*     Recent Labs   Lab 05/26/20  1136   TROP <0.045        Imaging: Imaging tai

## 2020-05-29 NOTE — PROGRESS NOTES
[facility] Progress Note      Patient Name: Humberto Pan   YOB: 1955  Medical Record Number: [id number]    Patient seen for anemia.  Etiology is multifactorial including a degree of iron deficiency, anemia of chronic

## 2020-05-29 NOTE — PROGRESS NOTES
[facility]  Progress Note     Cynorlina Pulling Patient Status:  Inpatient    1955 MRN [id number]   [facility] 3NE-A Attending Cary Castillo MD   [facility] Day # 4 PCP YOANA Rodgers     Chief Complaint: Anemia    S: Patient s 214*  --  184*  --   --   --    AST 49*  --  45*  --   --   --    ALT 29  --  28  --   --   --    BILT 1.5  --  1.1  --   --   --    TP 5.5*  --  5.1*  --   --   --     < > = values in this interval not displayed. Estimated Creatinine Clearance: 75. 6

## 2020-05-29 NOTE — PAYOR COMM NOTE
--------------  CONTINUED STAY REVIEW    Payor: JASSI PEREZ  Subscriber #:  [id number]  Authorization Number: [id number]  -FAXING CLINICAL UPDATE FOR 5/29/20    Admit date: 5/26/20  Admit time: 3209 5/29/20   Chief Complaint: Weakness   S:  No CP or SOB. significantly low.  Macrocytosis. 1. S/P PRBCs   2. Recheck hgb now  3. Pt reluctant for colon prep. 4. Dr. Kyaw Kinney input appreciated  5. Replace folate  6. GI consulted  7. EGD/Colonoscopy was for yesterday but patient refused- reconsidering.    2. Hypo Dose Route     5/28/2020 1629 New Bag 40 mEq Intravenous       potassium chloride 40 mEq in sodium chloride 0.9% 250 mL IVPB     Date Action Dose Route     5/28/2020 2303 New Bag 40 mEq Intravenous       0.9% NaCl infusion     Date Action Dose Route     5/

## 2020-05-29 NOTE — PLAN OF CARE
Patient alert and oriented x3. Forgetful at times. Impulsive. Bed alarm on. On RA, . NSR-ST on tele. HR up to 140s at times. MD aware. Excoriated bottom. Open to air. Creams applied. Denies pain. Up to commode. PO augmentin.   Resting comfor

## 2020-05-29 NOTE — PROGRESS NOTES
Pulmonary Progress Note        NAME: Karthikeyan Hernández - ROOM: 4678/2973-Z - MRN: [id number] - Age: 59year old - : 1955        Last 24hrs: No events overnight, s/p thoracentesis- transudative    OBJECTIVE:   20  0400 20  0600 20 effusion  -await culture and cytology  3. Bronchiectasis - RML - at risk for resistant organism / atypical infection  -bronch as noted above  4. COPD - moderate.   O/p PFTs with FEV1 1.12L (51%) ; DLCO 53%  -not currently in exacerbation  -cont Anoro  -prn

## 2020-05-29 NOTE — PLAN OF CARE
Pt. A&O x4 with periods of forgetfulness. On room air, tele shows sinus tachycardia, sometimes NSR. VSS. CIWAs Q2, scoring 1 all night. K replaced via IV, IVF 0.9 at 100. Pt.  Had difficulty taking large Augmentin pill despite breaking in half and putting i Problem: RESPIRATORY - ADULT  Goal: Achieves optimal ventilation and oxygenation  Description  INTERVENTIONS:  - Assess for changes in respiratory status  - Assess for changes in mentation and behavior  - Position to facilitate oxygenation and minimize r and patient weight  - Monitor urine specific gravity, serum osmolarity and serum sodium as indicated or ordered  - Monitor response to interventions for patient's volume status, including labs, urine output, blood pressure (other measures as available)  - Support and protect limb and body alignment per provider's orders  - Instruct and reinforce with patient and family use of appropriate assistive device and precautions (e.g. spinal or hip dislocation precautions)  Outcome: Progressing     Problem: Honor Bullocks

## 2020-05-30 NOTE — PLAN OF CARE
Pt. A&O x3, forgetful at times, especially with safety instructions. On RA, tele shows sinus tachycardia. Turning Q2. Pt. In posey vest for safety, multiple falls this admission despite bed alarm and increased rounding. VSS.  Incontinent with loose stools d supplementation based on oxygen saturation or ABGs  - Provide Smoking Cessation handout, if applicable  - Encourage broncho-pulmonary hygiene including cough, deep breathe, Incentive Spirometry  - Assess the need for suctioning and perform as needed  - Ass appropriate  - Instruct patient on fluid and nutrition restrictions as appropriate  Outcome: Progressing     Problem: SKIN/TISSUE INTEGRITY - ADULT  Goal: Incision(s), wounds(s) or drain site(s) healing without S/S of infection  Description  INTERVENTIONS: stable or improved neurological status  Description  INTERVENTIONS  - Assess for and report changes in neurological status  - Initiate measures to prevent increased intracranial pressure  - Maintain blood pressure and fluid volume within ordered parameters

## 2020-05-30 NOTE — PLAN OF CARE
Patient alert and oriented x3. Forgetful and confused at time. On Ra, . NSR-ST on tele. Bed alarm on. Frequent rounding. Up to bedside commode. Creams applied to bottom. Denies pain. Critical magnesium. Replaced per protocol. MD coates.   Francisco

## 2020-05-31 NOTE — PLAN OF CARE
Pt. A&O x4 but forgetful at times. On RA, tele shows sinus tachycardia. VSS. Up x1-2/walker. Mostly incontinent, still having loose stool due to previous bowel prep. No c/o pain or n/v. Cream applied to sacrum per wound care.  Family updated on plan of care based on oxygen saturation or ABGs  - Provide Smoking Cessation handout, if applicable  - Encourage broncho-pulmonary hygiene including cough, deep breathe, Incentive Spirometry  - Assess the need for suctioning and perform as needed  - Assess and instruct Instruct patient on fluid and nutrition restrictions as appropriate  Outcome: Progressing     Problem: SKIN/TISSUE INTEGRITY - ADULT  Goal: Incision(s), wounds(s) or drain site(s) healing without S/S of infection  Description  INTERVENTIONS:  - Assess and improved neurological status  Description  INTERVENTIONS  - Assess for and report changes in neurological status  - Initiate measures to prevent increased intracranial pressure  - Maintain blood pressure and fluid volume within ordered parameters to optimi

## 2020-05-31 NOTE — PLAN OF CARE
Patient alert to baseline and resting in bed with call light w/in reach and bed alarm on. Patient education provided regarding NPO status, patient continues to ask for water. Critical mg level 1.1, hospitalist notified.  Hold oral meds per hospitalist in skin integrity  - Assess and document dressing/incision, wound bed, drain sites and surrounding tissue  - Implement wound care per orders  - Initiate isolation precautions as appropriate  - Initiate Pressure Ulcer prevention bundle as indicated  Outcome: P

## 2020-05-31 NOTE — PROGRESS NOTES
[facility] Gastroenterology Progress Note    S: Pt with small volume hematemesis last night with clots     O: /80 (BP Location: Left arm)   Pulse 91   Temp 97.8 °F (36.6 °C) (Oral)   Resp 26   Ht 61.5\"   Wt 115 lb (52.2 kg)   SpO2 98%   BMI 21. eval and endoscopy         Aftab Age, 05/31/20, 3:46 PM\  [facility] Gastroenterology

## 2020-05-31 NOTE — PROGRESS NOTES
[facility]  Progress Note     Trinda Paci Patient Status:  Inpatient    1955 MRN [id number]   [facility] 3NE-A Attending Nellie Colón MD   [facility] Day # 5 PCP YOANA Lee     Chief Complaint: Anemia    S: Patient w 0. 74   GFRAA 83  --  106 113  --  113  --  99   GFRNAA 72  --  92 98  --  98  --  86   CA 6.7*  --  6.9* 6.8*  --  7.3*  --  6.7*   ALB 2.0*  --  1.9*  --   --   --   --   --    *  --  134* 139  --  139  --  142   K 2.2*   < > 3.8  3.7  3.7 3.2*   < malnutrition  11. Steatohepatitis  12. Elevated AST  13. Hyponatremia, resolved    Quality:  · DVT Prophylaxis: SCDs  · CODE status: Full  · Harrington: No  · Central line: No    Patient has been refusing procedures. Wants to go home on discharge.  I question if

## 2020-06-01 NOTE — PROGRESS NOTES
6783: CALLED  TO INQUIRE IF HE WAS AWARE OF CONSULT (NO RETURN CALL YESTERDAY).  IS AWARE AND WILL SEE THE PATIENT THIS AFTERNOON.     1522: PAGED DR. MANCUSO TO UPDATE HIM ON 'S RECOMMENDATIONS AND TO REQUEST HE YOVANI

## 2020-06-01 NOTE — PROGRESS NOTES
Contacted by RN, patient was deemed non-decisional by neuropysch. Also told by RN that  does not answer unknown numbers which is why he has not been answering our phone calls. I was advised to call daughter Gene Joseph [phone].     Spoke with Gene Joseph

## 2020-06-01 NOTE — DIETARY NOTE
[facility]    NUTRITION ASSESSMENT    Pt meets moderate malnutrition criteria.     NUTRITION DIAGNOSIS/PROBLEM:    Malnutrition (non-severe) related to physiological causes as evidenced by reported intake of less than 50% estimated needs over 2 weeks, lb)      NUTRITION:  Diet: Clear Liquids   Oral Supplements: Ensure Clear    FOOD/NUTRITION RELATED HISTORY:  Appetite: Fair  Intake: 0-100%  Intake Meeting Needs: No, but supplements to maximize  Food Allergies: No  Cultural/Ethnic/Adventism Preferences A

## 2020-06-01 NOTE — PROGRESS NOTES
[facility]  Progress Note     Jose Morris Patient Status:  Inpatient    1955 MRN [id number]   [facility] 3NE-A Attending Mich Ashford MD   [address] Day # 6 PCP YOANA Alves     Chief Complaint: Anemia    S: Patient i 94   CA 6.7*  --  6.9*   < > 7.3*  --  6.7* 7.0*   ALB 2.0*  --  1.9*  --   --   --   --   --    *  --  134*   < > 139  --  142 142   K 2.2*   < > 3.8  3.7  3.7   < > 3.6 4.3 3.9 3.6   CL 85*  --  98   < > 109  --  114* 116*   CO2 31.0  --  29.0   < dependence  9. Failure to thrive  10. Severe malnutrition  11. Steatohepatitis  12. Elevated AST  13.  Hyponatremia, resolved    Quality:  · DVT Prophylaxis: SCDs  · CODE status: Full  · Harrington: No  · Central line: No    Will the patient be referred to [facility] o

## 2020-06-01 NOTE — PLAN OF CARE
Problem: CARDIOVASCULAR - ADULT  Goal: Maintains optimal cardiac output and hemodynamic stability  Description  INTERVENTIONS:  - Monitor vital signs, rhythm, and trends  - Monitor for bleeding, hypotension and signs of decreased cardiac output  - Evalua consumed  - Identify factors contributing to decreased intake, treat as appropriate  - Assist with meals as needed  - Monitor I&O, WT and lab values  - Obtain nutritional consult as needed  - Optimize oral hygiene and moisture  - Encourage food from home; Problem: HEMATOLOGIC - ADULT  Goal: Maintains hematologic stability  Description  INTERVENTIONS  - Assess for signs and symptoms of bleeding or hemorrhage  - Monitor labs and vital signs for trends  - Administer supportive blood products/factors, fluids Promote increasing activity/tolerance for mobility and gait  - Educate and engage patient/family in tolerated activity level and precautions  - Recommend use of  RW and assist of 2 for transfers and ambulation   Outcome: Progressing     Problem: Impaired A

## 2020-06-01 NOTE — PROGRESS NOTES
PATIENT HAS IV FLUIDS INFUSING, ON ROOM AIR, BED ALARM IN PLACE D/T FORGETFULNESS, ON TELE RUNNING NSR, VOIDING WELL-URINE IS BROWN/CLAUDIA IN COLOR, DENIES PAIN, BUTT/PASHA AREA EXCORIATED-ZINC OXIDE AND ANTIFUNGAL CREAMS ORDERED, UP WITH ASSIST AND A WALKER

## 2020-06-01 NOTE — PROGRESS NOTES
Gastroenterology Follow-Up Note      Blanca Maria Patient Status:  Inpatient    1955 MRN [id number]   [facility] 3NE-A Attending Aldo Rogers MD   [address] Day # 6 PCP YONAA Bedolla     Chief Complaint/Reason for Follow Enzymes/NAFLD  -management as o/p       A.  Izzy Maynard  Gastroenterology/[facility] [place]  Gastroenterology

## 2020-06-01 NOTE — CONSULTS
[facility]  Report of Psychiatric Consultation    Yue Hidalgo Patient Status:  Inpatient    1955 MRN [id number]   [facility] 3NE-A Attending Luciana Epley, MD   [address] Day # 6 PCP YOANA Breen         History:      Past Amoxicillin-Pot Clavulanate (AUGMENTIN) 875-125 MG tab 1 tablet, 1 tablet, Oral, 2 times per day      Laboratory Data:  Lab Results   Component Value Date    WBC 5.3 06/01/2020    HGB 9.6 06/01/2020    HCT 29.6 06/01/2020    .0 06/01/2020    SANJUANITA appropriate attention. She admits that she has had some decline in memory over the past 6 months. Test Results    Overall the patient demonstrates global profound cognitive impairment.   At this point the severity of this seems as if it could be an acute 40 out of 60 is required to achieve the basal level of 1 (range 1-20, mean of 10). Abstraction and verbal reasoning results in a fairly concrete process with a T score of 37 (mean of 50 standard deviation of 10).     On the [facility] Gestalt Test attempt to first call the patient's , I did follow-up with her daughter eGne Joseph and provided detailed feedback about these test results.

## 2020-06-01 NOTE — CM/SW NOTE
MSW called pt's spouse to discuss dc plan.  He is upset that no Md's have called him, MSW paged MD to call pt's spouse    MSW sent updates to SNF's in [address], await Martir lopez.  Per spouse request MSW emailed Spouse list of current [facility] willing to accept

## 2020-06-01 NOTE — PLAN OF CARE
A&Ox4, forgetful at times. Calm and cooperative. Room air. VSS. NSR on tele. No complaints of pain. PT rec- [facility]. Awaiting neuropsych consult. Protonix gtt @10mL/hr. 0000 hemoglobin dropped from 10.7-9.1. IVF discontinued.  Mag replaced yesterday, redraw Clarence Castillo difficulty  - Respiratory Therapy support as indicated  - Manage/alleviate anxiety  - Monitor for signs/symptoms of CO2 retention  Outcome: Progressing     Problem: GASTROINTESTINAL - ADULT  Goal: Maintains adequate nutritional intake (undernourished)  Kevin ulcer development  - Assess and document skin integrity  - Assess and document dressing/incision, wound bed, drain sites and surrounding tissue  - Implement wound care per orders  - Initiate isolation precautions as appropriate  - Initiate Pressure Ulcer p hemorrhage  - Monitor temperature, glucose, and sodium.  Initiate appropriate interventions as ordered  Outcome: Progressing     Problem: Impaired Functional Mobility  Goal: Achieve highest/safest level of mobility/gait  Description  Interventions:  - Asses

## 2020-06-01 NOTE — OCCUPATIONAL THERAPY NOTE
OCCUPATIONAL THERAPY TREATMENT NOTE - INPATIENT     Room Number: 7600/3584-B  Session: 1   Number of Visits to Meet Established Goals: 5    Presenting Problem: weakness generalized    History related to current admission: Pt is 59year old female admitted OTHER SURGICAL HISTORY  3/31/16    L arm shoulder repair   • OTHER SURGICAL HISTORY  21years old    jaw repair   • SHOULDER OPEN REDUCTION INTERNAL FIXATION Left 4/1/2016    [facility]  \"Why do you want set    ASSESSMENT   Pt continues to work toward OT goal. Progress limited by limited participation level. OT will continue to assess participation level and progress. Pt will benefit from skilled OT services to maximize independence with ADLs.         OT

## 2020-06-01 NOTE — PROGRESS NOTES
GI Update:     Neuropsych eval reviewed. Recommend formal psych consultation prior to consideration of further endoscopy.      Shea Chicas  Gastroenterology/[facility] Gastroenterology

## 2020-06-02 NOTE — PAYOR COMM NOTE
--------------  CONTINUED STAY REVIEW    Payor: JASSI PEREZ  Subscriber #:  [id number]  Authorization Number: [id number]  -FAXING CLINICAL UPDATE FOR 5/30/20- 6/1/20    Admit date: 5/26/20  Admit time: 1736    5/30/20  Chief Complaint: Anemia     S: Patient 1 tablet Oral 2 times per day       ASSESSMENT / PLAN:      1. Acute encephalopathy, CT x 2 neg, UA & Utox neg, Ammonia low, etoh neg, improving  2. Fatigue, CTH without acute CNS process  1. PT/OT > [facility]  2. SW consult  3.  Anemia, FOBT neg in ER, folate an 1.43*  --   --   --      Lab 05/26/20  1136   05/27/20  0802 05/28/20  0604   05/30/20  0634 05/30/20  1507 05/31/20  0734   *  --  104* 86  --  105*  --  98   BUN 7  --  6* 3*  --  3*  --  3*   CREATSERUM 0.86  --  0.69 0.58  --  0.58  --  0.74   G Prophylaxis: SCDs  · CODE status: Full          6/1/20     Gastroenterology Follow-Up Note     Chief Complaint/Reason for Follow Up: 59year-old female being seen in follow up for anemia.    Objective:  Blood pressure 125/88, pulse 82, temperature 98 °F (3 1615 Given 20 mg Intravenous       ipratropium-albuterol (DUONEB) nebulizer solution 3 mL     Date Action Dose Route     6/2/2020 0855 Given 3 mL Nebulization     6/1/2020 1931 Given 3 mL Nebulization       magnesium sulfate IVPB premix 4 g     Date Action

## 2020-06-02 NOTE — PROGRESS NOTES
PSYCH CONSULT    Date of Admission: 5/26/20  Date of Consult: 6/2/20  Reason for Consultation: Anxiety, depression, alcohol use disoder    Impression:  Primary Psychiatric Diagnosis:  Severe alcohol use disorder (1/5 gallon vodka every 2 days per daughter)

## 2020-06-02 NOTE — PROGRESS NOTES
[facility]  Progress Note     Jose Morris Patient Status:  Inpatient    1955 MRN [id number]   [facility] 3NE-A Attending Mich Ashford MD   [address] Day # 7 PCP YOANA Alves     Chief Complaint: Anemia    S: Patient d < > 139  --  142 142  --   --    K 2.9* 3.8  3.7  3.7   < > 3.6   < > 3.9 3.6 5.7* 4.8   CL  --  98   < > 109  --  114* 116*  --   --    CO2  --  29.0   < > 23.0  --  22.0 19.0*  --   --    ALKPHO  --  184*  --   --   --   --   --   --   --    AST  --  4 SCDs  · CODE status: Full  · Harrington: No  · Central line: No    Will the patient be referred to [facility] on discharge?: No  Estimated date of discharge: TBD  Discharge is dependent on: Placement  At this point Ms. Luisa Herrera is expected to be discharge to: [place]

## 2020-06-02 NOTE — PHYSICAL THERAPY NOTE
PHYSICAL THERAPY TREATMENT NOTE - INPATIENT    Room Number: 9064/2621-K     Session: 1     Number of Visits to Meet Established Goals: 5    Presenting Problem: Generalized weakness    History related to current admission:  Pt is 59year old female admit History:   Diagnosis Date   • Diarrhea, unspecified    • Sputum production    • Stress    • Vomiting    • Wears glasses        Past Surgical History  Past Surgical History:   Procedure Laterality Date   • OTHER SURGICAL HISTORY  3/31/16    L arm shoulder r CK    FUNCTIONAL ABILITY STATUS  Gait Assessment   Gait Assistance: Not tested(pt refused )  Distance (ft): 0        Stoop/Curb Assistance: Not tested         Skilled Therapy Provided:     During eval and treat, pt on room air.      Bed Mobility:  Rolling r transfers EOB to/from [facility] at assistance level: minimum assistance      Goal #3 Patient is able to ambulate 20 feet with assist device: walker - rolling at assistance level: minimum assistance      Goal #4     Goal #5     Goal #6     Goal Comments: Goals es

## 2020-06-02 NOTE — PLAN OF CARE
Patient is A&Ox3 - episodes of confusion   Easily angered; very irritable  Impulsive - tries to get oob  Denies any pain or discomfort  NSR on tele - on room air  Up with 1 assist with a walker  IVF infusing  On po augmentin - asked to cut pill in half; tr hygiene including cough, deep breathe, Incentive Spirometry  - Assess the need for suctioning and perform as needed  - Assess and instruct to report SOB or any respiratory difficulty  - Respiratory Therapy support as indicated  - Manage/alleviate anxiety INTEGRITY - ADULT  Goal: Incision(s), wounds(s) or drain site(s) healing without S/S of infection  Description  INTERVENTIONS:  - Assess and document risk factors for pressure ulcer development  - Assess and document skin integrity  - Assess and document d Initiate measures to prevent increased intracranial pressure  - Maintain blood pressure and fluid volume within ordered parameters to optimize cerebral perfusion and minimize risk of hemorrhage  - Monitor temperature, glucose, and sodium.  Initiate appropri PT/OT EVAL-[facility]ICE PLACEMENT  -LABS WNL (HGB STABLE)  - See additional Care Plan goals for specific interventions   Outcome: Progressing  Goal: Patient/Family Short Term Goal  Description  Patient's Short Term Goal: PREPARE FOR DISCHARGE    Interventions:   -
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
## 2020-06-02 NOTE — CM/SW NOTE
Care Progression Note:  Active Acute Medical Issue:   Weakness generalized. HGB 6.3, MG 1.3 & K 2.2    Other Contributing Medical Factors/Dx. : Fatty Liver disease. ETOH.     Length of stay: 7  Avoidable Delays:   Discharge Barriers: Pt refused EGD/[place]

## 2020-06-02 NOTE — PROGRESS NOTES
Discussed case with psych and Gi  Patient does not have insight and  would be default decision-maker. Given history of alcohol abuse and cirrhosis, presumed UGIB d/t varices. Patient would need NGT for bowel prep and risk bleeding from varices.

## 2020-06-02 NOTE — PLAN OF CARE
Resumed patient care @0942  Patient is Aox3  On room air  Expiratory wheezing, RN educated on importance of IS, patient declined to perform or use IS  NSR at rest, ST with activity, on tele  Small BM early AM--uses bedside commode  Incontinent, has brief based on oxygen saturation or ABGs  - Provide Smoking Cessation handout, if applicable  - Encourage broncho-pulmonary hygiene including cough, deep breathe, Incentive Spirometry  - Assess the need for suctioning and perform as needed  - Assess and instruct Instruct patient on fluid and nutrition restrictions as appropriate  Outcome: Progressing     Problem: SKIN/TISSUE INTEGRITY - ADULT  Goal: Incision(s), wounds(s) or drain site(s) healing without S/S of infection  Description  INTERVENTIONS:  - Assess and improved neurological status  Description  INTERVENTIONS  - Assess for and report changes in neurological status  - Initiate measures to prevent increased intracranial pressure  - Maintain blood pressure and fluid volume within ordered parameters to optimi Goals  Goal: Patient/Family Long Term Goal  Description  Patient's Long Term Goal: RETURN HOME    Interventions:  - PT/OT EVAL-PRICE PLACEMENT  -LABS WNL (HGB STABLE)  - See additional Care Plan goals for specific interventions   Outcome: Progressing  Goal:

## 2020-06-02 NOTE — PROGRESS NOTES
Brief GI note:     Awaiting psychiatry consult to assist in next steps regarding mental status.  She has declined EGD/Colonoscopy despite its medical necessity however neuropsych jessica deemed her non-decisional. As such, assistance will be required with her

## 2020-06-03 NOTE — PAYOR COMM NOTE
--------------  CONTINUED STAY REVIEW    Payor: JASSI PEREZ  Subscriber #:  [id number]  Authorization Number: [id number]  -CLINICALS UP TO 6/1/20 FAXED ON 6/2/20- FAXING CLINICAL UPDATE FOR 6/2/20- 6/3/20    Admit date: 5/26/20  Admit time: 1736    6/2/20 4. 7* 5.1*  --   --   --   --   --   --   --       Medications:   • magnesium sulfate  4 g Intravenous Once   • pantoprazole (PROTONIX) IV push  40 mg Intravenous Q12H   • ipratropium-albuterol  3 mL Nebulization O0J WA   • folic acid  1 mg Oral Daily   • u 10.7* 9.1* 9.6* 9.7* 9.4*   MCV  --  116.7*  --  118.7*  --   --  103.5* 102.8* 104.3*   PLT  --  213.0  --  186.0  --   --  195.0 195.0 210.0   INR 1.43*  --   --   --   --   --   --   --   --                 Lab 05/31/20  5141 06/01/20  0528 06/01/20  15 Intravenous       ipratropium-albuterol (DUONEB) nebulizer solution 3 mL     Date Action Dose Route     6/3/2020 0854 Given 3 mL Nebulization       Pantoprazole Sodium (PROTONIX) 40 mg in Sodium Chloride 0.9 % 10 mL IV push     Date Action Dose Route     6

## 2020-06-03 NOTE — DIETARY NOTE
[facility]    NUTRITION ASSESSMENT    Pt meets moderate malnutrition criteria.     NUTRITION DIAGNOSIS/PROBLEM:    Malnutrition (non-severe, chronic) related to physiological causes as evidenced by reported intake of less than 50% estimated needs over 1.5\")  Wt: 52.2 kg (115 lb). This is 109% of IBW  BMI: Body mass index is 21.38 kg/m².   IBW: 47.7 kg      WEIGHT HISTORY:   Wt Readings from Last 6 Encounters:  05/26/20 : 52.2 kg (115 lb)  03/05/20 : 52.2 kg (115 lb)  10/30/19 : 53.5 kg (118 lb)  08/14/1

## 2020-06-03 NOTE — OPERATIVE REPORT
Katja Angeles Patient Status:  Inpatient    1955 MRN [id number]   [facility] ENDOSCOPY Attending Joan Kiran MD   [facility] Day # 8 PCP YOANA Marc       PREOPERATIVE DIAGNOSIS/INDICATION:  Iron Deficiency Anemia  P 8 weeks. 3. Given significant challenges associated with colonoscopy, recommend deferring at this time given EGD findings. 4. Will schedule follow up in GI office. 5. Will sign off at this time. Please call with questions.      Frank Felty

## 2020-06-03 NOTE — INTERVAL H&P NOTE
Pre-op Diagnosis: INPT    The above referenced H&P was reviewed by Cris Johns DO on 6/3/2020, the patient was examined and no significant changes have occurred in the patient's condition since the H&P was performed.   I discussed with the patient and/

## 2020-06-03 NOTE — PLAN OF CARE
Resumed care of pt. At [address]. Pt. Is Ax2, to person and place but not time or situation. Pt. Setting off bed alarms numerous times stating, \"I want to go have a cigarette\". Pt. Was deemed non-decisional and therefore cannot leave.     Angry, vulgari effort  - Oxygen supplementation based on oxygen saturation or ABGs  - Provide Smoking Cessation handout, if applicable  - Encourage broncho-pulmonary hygiene including cough, deep breathe, Incentive Spirometry  - Assess the need for suctioning and perform oral intake as appropriate  - Instruct patient on fluid and nutrition restrictions as appropriate  Outcome: Progressing     Problem: SKIN/TISSUE INTEGRITY - ADULT  Goal: Incision(s), wounds(s) or drain site(s) healing without S/S of infection  Description ADULT  Goal: Achieves stable or improved neurological status  Description  INTERVENTIONS  - Assess for and report changes in neurological status  - Initiate measures to prevent increased intracranial pressure  - Maintain blood pressure and fluid volume wit Progressing     Problem: Patient/Family Goals  Goal: Patient/Family Long Term Goal  Description  Patient's Long Term Goal: RETURN HOME    Interventions:  - PT/OT EVAL-PRICE PLACEMENT  -LABS WNL (HGB STABLE)  - See additional Care Plan goals for specific inte

## 2020-06-03 NOTE — PROGRESS NOTES
Patient alert x3 this am, now patient is alert to self. RA, tele, up with assist to [facility]. Patient cooperative. EGD this am.  Paged Dr. Rasheeda Peoples to call spouse but no answer. Repaged Dr. Rasheeda Peoples to call daughter Maggie Cao for update to procedure.   PT nik

## 2020-06-03 NOTE — CM/SW NOTE
SW called pt' s  and emailed him list of accepting SARs: [address] , [place] and [place]. He will review and let sw know choice. Pt had EGD today.     Jessica Kang LCSW  /Discharge Planner  [phone]

## 2020-06-03 NOTE — OCCUPATIONAL THERAPY NOTE
Attempted to see pt for OT. Pt is off the floor for EGD. Will re-attempt to see pt as appropriate and as able.

## 2020-06-03 NOTE — PROGRESS NOTES
[facility]  Progress Note     Cat Mccall Patient Status:  Inpatient    1955 MRN [id number]   [facility] 3NE-A Attending Cary Castillo MD   Hosp Day # 8 PCP Herb Manning MD     Chief Complaint: Anemia    S: Patient Labs   Lab 05/28/20  1134   PTP 17.9*   INR 1.43*       No results for input(s): TROP, CK in the last 168 hours. Imaging: Imaging data reviewed in Epic.     Medications:   • pantoprazole (PROTONIX) IV push  40 mg Intravenous Q12H   • ipratropium-alb

## 2020-06-03 NOTE — ANESTHESIA POSTPROCEDURE EVALUATION
[facility] Patient Status:  Inpatient   Age/Gender 59year old female MRN [id number]   Location [facility]. Attending Amita Avina, [address] [facility] Day # 8 PCP YOANA Orosco       Anesthesia Post-op Note    Procedure(
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
## 2020-06-04 NOTE — PROGRESS NOTES
[facility]  Progress Note     Jannet Ley Patient Status:  Inpatient    1955 MRN [id number]   [facility] 3NE-A Attending Piedad Stanton MD   [facility] Day # 9 PCP Jonathan Hickey MD     Chief Complaint: Anemia    S: Patient Creatinine Clearance: 75.6 mL/min (based on SCr of 0.58 mg/dL). Recent Labs   Lab 05/28/20  1134   PTP 17.9*   INR 1.43*       No results for input(s): TROP, CK in the last 168 hours. Imaging: Imaging data reviewed in Epic.     Medications:   • P

## 2020-06-04 NOTE — CM/SW NOTE
RYAN spoke to patient's spouse and updated him that [facility] can not accept patient until early next week. RYAN updated him that [address] will accept patient-he will call his dtr and call MSLISANDRA back with final choice.     Spouse called RYAN and they

## 2020-06-04 NOTE — PLAN OF CARE
Assumed care at [address]. Pt is A&O x2, oriented to self and time. Deemed non- decisional.   On RA, . Nonproductive cough, expiratory wheezes noted upon assessment. 02 sats WNL. NSR on tele. Briefed, incontinent. Denies pain.  X2 assist, ambulates
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
## 2020-06-04 NOTE — PLAN OF CARE
Assumed care at 0730, A/Ox2, R/A w/expiratory wheezes and non-productive cough. NSR on tele w/ST during movement. Briefed. Denies pain. Pt removed IV. Stated she didn't want it in.  Waiting to hear back from SW regarding discharge before inserting another obtain 12 lead EKG if indicated  - Evaluate effectiveness of antiarrhythmic and heart rate control medications as ordered  - Initiate emergency measures for life threatening arrhythmias  - Monitor electrolytes and administer replacement therapy as ordered

## 2020-06-04 NOTE — OCCUPATIONAL THERAPY NOTE
Attempted to see twice. First, occupied with personal care. Second, refused therapy. Pt commented, Gisele Mcghees you crazy? I am staying in bed. \" Will need to monitor participation level.

## 2020-06-05 NOTE — PROGRESS NOTES
[facility]  Progress Note     Bruce Graham Patient Status:  Inpatient    1955 MRN [id number]   [facility] 3NE-A Attending Monty Acharya MD   Hosp Day # 8 PCP Wesley Osler, MD     Chief Complaint: Anemia    S: Patient TROP, CK in the last 168 hours. Imaging: Imaging data reviewed in Epic.     Medications:   • Pantoprazole Sodium  40 mg Oral BID AC   • metoprolol Tartrate  50 mg Oral 2x Daily(Beta Blocker)   • ipratropium-albuterol  3 mL Nebulization Q6H WA   • fo

## 2020-06-05 NOTE — PLAN OF CARE
Assumed care at 1. Pt is A&O x1, only oriented to self, very confused and forgetful. Mood is labile and at times follows commands then refuses tele, IV placement, and monitoring BP. On RA, . NSR on tele, ST with activity.  BP elevated systolic a

## 2020-06-05 NOTE — PLAN OF CARE
A/Ox2, impulsive. Tries to get out of bed, takes off tele. Tele moved to back. Continues to remove tele. Bed alarm on, chair alarm on when in chair, floor mat in place, call button in reach, bed in lowest position. R/A, NSR when on tele.  Briefed and inc.

## 2020-06-05 NOTE — OCCUPATIONAL THERAPY NOTE
OCCUPATIONAL THERAPY TREATMENT NOTE - INPATIENT     Room Number: 1290/7447-G  Session: 2   Number of Visits to Meet Established Goals: 5    Presenting Problem: weakness generalized    History related to current admission: Pt is 59year old female admitted Vomiting    • Wears glasses        Past Surgical History  Past Surgical History:   Procedure Laterality Date   • OTHER SURGICAL HISTORY  3/31/16    L arm shoulder repair   • OTHER SURGICAL HISTORY  21years old    jaw repair   • SHOULDER OPEN REDUCTION INT huyen care and pants management up due to impaired self-care responsibility. Agreeable to transfer to bedside chair with encouragement, CGA due to impulsivity. Would like to be wheeled around hallway, declines functional mobility.    Extended conversation RE training;Cognitive reorientation;Patient/Family education;Patient/Family training;Equipment eval/education; Neuromuscluar reeducation; Compensatory technique education;Continued evaluation  Rehab Potential : Fair  Frequency (Obs): 3-5x/week      OT Goals:

## 2020-06-05 NOTE — PROGRESS NOTES
[facility]  Report of Psychiatric Progress Note    Adalgisa Goodmann Patient Status:  Inpatient    1955 MRN [id number]   [facility] 3NE-A Attending Gerald Brandon MD   [facility] Day # 8 PCP Janyce Krabbe, MD     Date of Admission: Hb of 6.3 from a baseline of 16 in 2019. She has a component of Fe deficiency anemia per heme. She had an episode of hematemesis on 5/30. She has received 2 blood transfusions and Hb is 9.7 today 6/2/20.  She was eval by GI and had an US that showed fatty l hopelessness or suicidal ideation. 6/4/20- She feels tired and anxious and irritable. She doesn't want to talk to me. She is ambivalent about quitting drinking. She denies suicidal ideation.      6/5/20- She feels \"ok\" and denies anxiety and depressed Allergies    Medications:    Current Facility-Administered Medications:   •  Pantoprazole Sodium (PROTONIX) EC tab 40 mg, 40 mg, Oral, BID AC  •  metoprolol Tartrate (LOPRESSOR) tab 50 mg, 50 mg, Oral, 2x Daily(Beta Blocker)  •  ipratropium-albuterol (DUON describe that her  is enabling as the patient has decreased in her overall mobility and he continues to make her about 3 drinks per day. The past her drinks were much more heavy and she would drink much more frequently.   Overall the patient's nonco numerical order\" rather than actually trying to organize the digits.   Given his profound impairment I would not expect particularly well organized responses to the thematic apperception test or the Rorschach Inkblot Test but her responses revealed an esca 6 months but I am not too sure if she is an appropriate historian at this time. Her daughter describes that any deficits have only been in the last few months and she is surprised by the level of confusion at this time.   There is no clear progressive proc

## 2020-06-05 NOTE — PAYOR COMM NOTE
--------------  CONTINUED STAY REVIEW    Payor: JASSI PEREZ  Subscriber #:  [id number]  Authorization Number: [id number]  -FAXING CLINICAL UPDATE FOR 6/4/20    Admit date: 5/26/20  Admit time: 1736 6/4/20   Chief Complaint: Anemia     S: Patient denies 5/30  1. Diet as tolerated  2. Stop IVF  3. PPI - change to PO BID x 8 weeks  4. Outpatient follow-up for cscope consideration  5. GI on consult   3. Acute encephalopathy, CT x 2 neg, UA & Utox neg, Ammonia low, etoh neg   1.  PT/OT > [facility]  2. SW on consult

## 2020-06-05 NOTE — CM/SW NOTE
3pm  MSW returned call to pt's Dtr Gabbie Kaminski [phone]. MSW discussed PT rec and limitations with Pt's Insurance. Discussed that pt is not working with PT so there is no skilled need for [employer] to approve. One referral for snf is pending with [facility].     3:5

## 2020-06-05 NOTE — PHYSICAL THERAPY NOTE
PHYSICAL THERAPY TREATMENT NOTE - INPATIENT    Room Number: 1676/5738-U     Session: 2    Number of Visits to Meet Established Goals: 5    Presenting Problem: Generalized weakness    History related to current admission:  Pt is 59year old female admitt Depressive disorder    Cognitive impairment    Essential hypertension      Past Medical History  Past Medical History:   Diagnosis Date   • Diarrhea, unspecified    • Sputum production    • Stress    • Vomiting    • Wears glasses        Past Surgical Histo Moving to and from a bed to a chair (including a wheelchair)?: A Little   -   Need to walk in hospital room?: A Lot   -   Climbing 3-5 steps with a railing?: Total       AM-PAC Score:  Raw Score: 15   Approx Degree of Impairment: 57.7%   Standardized Score don't fall, pt verbalizes understanding. Patient End of Session: Up in chair;Call light within reach; Needs met;RN aware of session/findings; All patient questions and concerns addressed; Alarm set    ASSESSMENT   Pt has made no progress toward functional

## 2020-06-05 NOTE — CM/SW NOTE
DC PLAN:  1. Home with Home Health and hire caregiver or family support  2.  Try to find a nursing home under Private Pay

## 2020-06-05 NOTE — CM/SW NOTE
9:15am  MSW called pt's spouse to discuss pt's progression in physical therapy and PT recommendation. At this time no [facility] will accept patient, and if Pt is not participating with PT then the chance that [employer] will approve a skilled need is reduced.  RYAN told

## 2020-06-06 NOTE — PLAN OF CARE
Pt Alert to self, can be impulsive at times  Resting in bed  NSR on Tele  Denies pain  Briefed in place, incontinent at times  Soft diet   Medically cleared  PT rec 24hr supervision  Safety precaution maintained  Will continue to monitor    Problem: CARDIO anxiety  - Monitor for signs/symptoms of CO2 retention  Outcome: Progressing     Problem: GASTROINTESTINAL - ADULT  Goal: Maintains adequate nutritional intake (undernourished)  Description  INTERVENTIONS:  - Monitor percentage of each meal consumed  - Idalmis Mckeon dressing/incision, wound bed, drain sites and surrounding tissue  - Implement wound care per orders  - Initiate isolation precautions as appropriate  - Initiate Pressure Ulcer prevention bundle as indicated  Outcome: Progressing     Problem: HEMATOLOGIC - interventions as ordered  Outcome: Progressing     Problem: Impaired Functional Mobility  Goal: Achieve highest/safest level of mobility/gait  Description  Interventions:  - Assess patient's functional ability and stability  - Promote increasing activity/t ADVANCE DIET AS TOLERATED  -TRANSITION IV TO ORAL MEDICATIONS  -ENCOURAGE ACTIVITY-PHYSICAL THERAPY EVAL  - See additional Care Plan goals for specific interventions   Outcome: Progressing

## 2020-06-06 NOTE — CM/SW NOTE
PT re-evaluated pt for potential [facility]. PT is now recommending [facility]. Sent updated PT evaluation via [employer]. Noticed that the [address] [place] [address] Road is able to accept pt. SW messaged [facility] on [facility] to see if they could accept pt under [facility].

## 2020-06-06 NOTE — PHYSICAL THERAPY NOTE
PHYSICAL THERAPY EVALUATION - INPATIENT     Room Number: 4234/6818-Y  Evaluation Date: 6/6/20  Type of Evaluation: Re-evaluation  Physician Order: PT Eval and Treat    Presenting Problem: acute encephelopathy, acute GI bleed, anemia  Reason for REMI POSADAS [facility] Essential hypertension      Past Medical History  Past Medical History:   Diagnosis Date   • Diarrhea, unspecified    • Sputum production    • Stress    • Vomiting    • Wears glasses        Past Surgical History  Past Surgical History:   Procedure Alexandre Freeman repetition  · Initiation: hand over hand to initiate tasks  · Safety Judgement:  decreased awareness of need for assistance and decreased awareness of need for safety  · Awareness of Deficits:  decreased awareness of deficits    RANGE OF MOTION AND STRENGT agitated when encouraged to participate but agreeable with time and cueing. Chair alarm in place. Updated pt on role of PT, POC, DC planning/recs, positioning.,activity.  PPE donned for session: gloves, surgical mask        Exercise/Education Provided:  Bed minimum assistance     Goal #2 Patient is able to demonstrate transfers EOB to/from [facility] at assistance level: CGA     Goal #3 Patient is able to ambulate 20 feet with assist device: walker - rolling at assistance level: minimum assistance     Goal #4 Pt jace

## 2020-06-06 NOTE — PLAN OF CARE
A/O to self. Non-decisional.  On RA. Tele-NSR. Briefed for incontinence. Medically cleared for DC. PT re-evaluating now to determine appropriate dc, PRICE v. Home with 24 hour supervision/care. Social work following and assisting in [employer] needs.   Staff will Spirometry  - Assess the need for suctioning and perform as needed  - Assess and instruct to report SOB or any respiratory difficulty  - Respiratory Therapy support as indicated  - Manage/alleviate anxiety  - Monitor for signs/symptoms of CO2 retention  Juancho Yo site(s) healing without S/S of infection  Description  INTERVENTIONS:  - Assess and document risk factors for pressure ulcer development  - Assess and document skin integrity  - Assess and document dressing/incision, wound bed, drain sites and surrounding Maintain blood pressure and fluid volume within ordered parameters to optimize cerebral perfusion and minimize risk of hemorrhage  - Monitor temperature, glucose, and sodium.  Initiate appropriate interventions as ordered  Outcome: Progressing     Problem: additional Care Plan goals for specific interventions   Outcome: Progressing  Goal: Patient/Family Short Term Goal  Description  Patient's Short Term Goal: PREPARE FOR DISCHARGE    Interventions:   - ADVANCE DIET AS TOLERATED  -TRANSITION IV TO ORAL MEDICA

## 2020-06-06 NOTE — PROGRESS NOTES
[facility]  Progress Note     Adalgisa Luisa Patient Status:  Inpatient    1955 MRN [id number]   [facility] 3NE-A Attending Mikayla Andino MD   [address] Day # 6 PCP Janyce Krabbe, MD     Chief Complaint: Anemia    S: Patient CK in the last 168 hours. Imaging: Imaging data reviewed in Epic.     Medications:   • metoprolol tartrate  25 mg Oral 2x Daily(Beta Blocker)   • Pantoprazole Sodium  40 mg Oral BID AC   • ipratropium-albuterol  3 mL Nebulization H5H WA   • folic ac

## 2020-06-07 NOTE — PROGRESS NOTES
[facility]  Progress Note     Joycelyn Marquez Patient Status:  Inpatient    1955 MRN [id number]   [facility] 3NE-A Attending Randal Noyola MD   Hosp Day # 15 PCP Alex Shah MD     Chief Complaint: Anemia    S: Patient mg Oral BID AC   • ipratropium-albuterol  3 mL Nebulization V9N WA   • folic acid  1 mg Oral Daily   • umeclidinium-vilanterol  1 puff Inhalation Daily       ASSESSMENT / PLAN:     1.  Acute encephalopathy, CT x 2 neg, UA & Utox neg, Ammonia low, etoh neg -

## 2020-06-07 NOTE — PLAN OF CARE
Pt Alert to self, can be impulsive at times  Resting in bed  NSR on Tele  Denies pain  Briefed in place, incontinent at times  Soft diet   Medically cleared for DC  PT/OT RE-eval pt rec PRICE at this time.    Message sent to [address] [place] [address] to see if they cou any respiratory difficulty  - Respiratory Therapy support as indicated  - Manage/alleviate anxiety  - Monitor for signs/symptoms of CO2 retention  Outcome: Progressing     Problem: GASTROINTESTINAL - ADULT  Goal: Maintains adequate nutritional intake (unde for pressure ulcer development  - Assess and document skin integrity  - Assess and document dressing/incision, wound bed, drain sites and surrounding tissue  - Implement wound care per orders  - Initiate isolation precautions as appropriate  - Initiate Pre minimize risk of hemorrhage  - Monitor temperature, glucose, and sodium.  Initiate appropriate interventions as ordered  Outcome: Progressing     Problem: Impaired Functional Mobility  Goal: Achieve highest/safest level of mobility/gait  Description  Ewa Domenic Goal  Description  Patient's Short Term Goal: PREPARE FOR DISCHARGE    Interventions:   - ADVANCE DIET AS TOLERATED  -TRANSITION IV TO ORAL MEDICATIONS  -ENCOURAGE ACTIVITY-PHYSICAL THERAPY EVAL  - See additional Care Plan goals for specific interventions

## 2020-06-08 NOTE — PLAN OF CARE
Pt A&Ox1-2 Room Air  Resting in bed  NSR on Tele  Meds given per Mar  PIV infusing . [email or web address]  Electrolyte protocol  UA sent  Cream applied to buttocks  Brief in place  PT/OT rec PRICE  Safety precaution maintained  Will continue to monitor      Problem: Cody Agosto anxiety  - Monitor for signs/symptoms of CO2 retention  Outcome: Progressing     Problem: GASTROINTESTINAL - ADULT  Goal: Maintains adequate nutritional intake (undernourished)  Description  INTERVENTIONS:  - Monitor percentage of each meal consumed  - Gisselle Kelley dressing/incision, wound bed, drain sites and surrounding tissue  - Implement wound care per orders  - Initiate isolation precautions as appropriate  - Initiate Pressure Ulcer prevention bundle as indicated  Outcome: Progressing     Problem: HEMATOLOGIC - interventions as ordered  Outcome: Progressing     Problem: Impaired Functional Mobility  Goal: Achieve highest/safest level of mobility/gait  Description  Interventions:  - Assess patient's functional ability and stability  - Promote increasing activity/t ADVANCE DIET AS TOLERATED  -TRANSITION IV TO ORAL MEDICATIONS  -ENCOURAGE ACTIVITY-PHYSICAL THERAPY EVAL  - See additional Care Plan goals for specific interventions   Outcome: Progressing

## 2020-06-08 NOTE — CM/SW NOTE
3:15pm  DC PLAN:  [facility] will accept patient IF they can get insurance auth. Insurance Zhanna Herrera is pending at this time.

## 2020-06-08 NOTE — PROGRESS NOTES
Patient up to bathroom and voided in toilet. Unfortunately she missed he collection container so I could not measure the output. Patient says she voided without difficulty.

## 2020-06-08 NOTE — PROGRESS NOTES
[facility]  Report of Psychiatric Progress Note    Hector Westbrook Patient Status:  Inpatient    1955 MRN [id number]   [facility] 3NE-A Attending Jody Michael MD   [facility] Day # 15 PCP Ivy Allen MD     Date of Admission: procedures, but was deemed non-decisional due to acute cognitive impairment by neuropsych on 6/1/20. Her  wants her to have the procedures.      She is known to the psych service when she broke her Lt humerus s/p fall when she was intoxicated in 3/20 hopelessness and suicidal ideation. She doesn't know if she will stop drinking alcohol. 6/8/20- She feels \"fine\" and denies anxiety and depressed or irritable mood. She denies hopelessness or suicidal ideation. She is still waiting to go to [facility] MG/0.4ML injection 40 mg, 40 mg, Subcutaneous, Daily  •  0.9% NaCl infusion, , Intravenous, Continuous  •  nystatin (MYCOSTATIN) 709540 UNIT/GM cream, , Topical, TID  •  metoprolol Tartrate (LOPRESSOR) tab 25 mg, 25 mg, Oral, 2x Daily(Beta Blocker)  •  Pan for Referral    Patient was referred for a neuropsychological evaluation to assist with differential diagnosis and treatment planning. The patient has been refusing recommended medical care. The family is concerned as she is a longtime alcoholic.   Her da test is a fairly concrete task at the requires her to recite 3 digits forward and then organize 3 digits in numerical order with providing instructions to this test multiple times she primarily was echolalic.   She would simply echo back \"please put those and she rewrote the #11 on the hand when it was directed at the 11 itself.     The patient endorses anger, helplessness and frustration. Thematic apperception test reveals helplessness and escape.   Patient describes that she has had some memory changes ov

## 2020-06-08 NOTE — PROGRESS NOTES
[facility]  Progress Note     Yue Hidalgo Patient Status:  Inpatient    1955 MRN [id number]   [facility] 3NE-A Attending Darryl Kelsey MD   [facility] Day # 15 PCP Sotero Petersen MD     Chief Complaint: Anemia    S: Patient metoprolol tartrate  25 mg Oral 2x Daily(Beta Blocker)   • Pantoprazole Sodium  40 mg Oral BID AC   • ipratropium-albuterol  3 mL Nebulization Q9O WA   • folic acid  1 mg Oral Daily   • umeclidinium-vilanterol  1 puff Inhalation Daily       ASSESSMENT / PL

## 2020-06-08 NOTE — PAYOR COMM NOTE
--------------  CONTINUED STAY REVIEW    Payor: JASSI PEREZ  Subscriber #:  [id number]  Authorization Number: [id number]    Admit date: 5/26/20  [facility]- 6/8/20 6/5/20   Chief Complaint: Anemia     S: Patie hours.     No results for input(s): TROP, CK in the last 168 hours.           Imaging: Imaging data reviewed in Epic.     Medications:   • Pantoprazole Sodium  40 mg Oral BID AC   • metoprolol Tartrate  50 mg Oral 2x Daily(Beta Blocker)   • ipratropium-alb guarding. Musculoskeletal: Moves all extremities.   Extremities: No edema.     Lab 05/31/20  0734   06/01/20  0042 06/01/20  0528 06/02/20  0524 06/03/20  0527 06/04/20  0622   WBC 3.6*  --   --  5.3 5.1 4.4 6.6   HGB 7.0*   < > 9.1* 9.6* 9.7* 9.4* 11.4* SCDs            6/7/20  Chief Complaint: Anemia   S: Patient denies new complaints. Laying in bed.  On room air.    Vital signs:  Temp:  [97.6 °F (36.4 °C)-98.3 °F (36.8 °C)] 97.6 °F (36.4 °C)  Pulse:  [] 64  Resp:  [12-26] 12  BP: ()/(46-99) 12 cayetano 5/28  9. Chronic bronchiectasis, empiric abx completed  10. COPD  1. BD  2. Incentive spirometry  3. Oxygen: remains on room air  4. Outpatient bronch  11. Rash  1. Add nystatin cream  12. Moderate malnutrition  13. Nicotine dependence  14.  Failure t electrolytes  8. Pleural effusion sp diagnostic thora 5/28  9. Chronic bronchiectasis, empiric abx completed  10. COPD  1. BD  2. Incentive spirometry  3. Oxygen: remains on room air  4. Outpatient bronch  11. Rash  1. Nystatin cream  12.  Moderate malnutri

## 2020-06-08 NOTE — PLAN OF CARE
Assumed care at 0730. Pt a/ox2-3, VSS, on ra, NSR on telemetry. Pt with no c/o pain, n/v/d, SOB, dizziness/lightheadedness. Fall precautions in place. AM mg 1.1, replaced per protocol. Dr. Candelario aware, and also ordered 4mg IV replacement.  Potassium replac oxygenation  Description  INTERVENTIONS:  - Assess for changes in respiratory status  - Assess for changes in mentation and behavior  - Position to facilitate oxygenation and minimize respiratory effort  - Oxygen supplementation based on oxygen saturation RN  Outcome: Progressing  Goal: Hemodynamic stability and optimal renal function maintained  Description  INTERVENTIONS:  - Monitor labs and assess for signs and symptoms of volume excess or deficit  - Monitor intake, output and patient weight  - Monitor u stability and activity tolerance for standing, transferring and ambulating w/ or w/o assistive devices  - Assist with transfers and ambulation using safe patient handling equipment as needed  - Ensure adequate protection for wounds/incisions during mobiliz Manish Herrera, RN  Outcome: Progressing  6/7/2020 1850 by Amber Goodson RN  Outcome: Progressing     Problem: Patient/Family Goals  Goal: Patient/Family Long Term Goal  Description  Patient's Long Term Goal: RETURN HOME    Interventions:  - PT/OT CARMINA-PRICE PLAC

## 2020-06-08 NOTE — DIETARY NOTE
[facility]    NUTRITION ASSESSMENT    Pt meets moderate malnutrition criteria.     NUTRITION DIAGNOSIS/PROBLEM:    Malnutrition (non-severe, chronic) related to physiological causes as evidenced by reported intake of less than 50% estimated needs over have the appropriate food in the home. RD discussed meal delivery programs and pt was open to look into them further. RD will speak with SW. She was also upset about her NPO status. ANTHROPOMETRICS:  Ht: 156.2 cm (5' 1.5\")  Wt: 52.2 kg (115 lb).  [place]ee [place]

## 2020-06-08 NOTE — PLAN OF CARE
Patient alert but confused. Currently being treated w/ IV antibiotic for UTI. Pending sensitivity results so she can be transitioned to PO. VSS. DTV about 1400 today. I updated patient's  w/ current POC. PRICE acceptance is pending.  Safety precautions

## 2020-06-09 NOTE — PROGRESS NOTES
Assumed care at [phone]. Patient had two large BMs this afternoon. Will continue to monitor. Patient confused but alert to self. Patient had another large BM. C/o pain in her shoulders but refused pain medication. Repositioned for comfort.   Will beth

## 2020-06-09 NOTE — PROGRESS NOTES
[facility]  Progress Note     Ann Olivares Patient Status:  Inpatient    1955 MRN [id number]   [facility] 3NE-A Attending Dawit Barber MD   [facility] Day # 15 PCP Akanksha Mcginnis MD     Chief Complaint: Anemia    S: Patient tartrate  25 mg Oral 2x Daily(Beta Blocker)   • Pantoprazole Sodium  40 mg Oral BID AC   • ipratropium-albuterol  3 mL Nebulization H7F WA   • folic acid  1 mg Oral Daily   • umeclidinium-vilanterol  1 puff Inhalation Daily       ASSESSMENT / PLAN:     1.

## 2020-06-09 NOTE — PLAN OF CARE
Assumed patient care @1908. Patient a&ox2, disoriented to place & situation. On RA. No tele. Electrolyte protocol. Positive UTI- IV rocephin, awaiting urine cx results- can transition to po abx when ready. Stage 2 wound on sacrum, open to air.   cole care  Description  Interventions:  - Assess ability and encourage patient to participate in ADLs to maximize function  - Promote sitting position while performing ADLs such as feeding, grooming, and bathing  - Educate and encourage patient/family in [place] deficit  - Monitor intake, output and patient weight  - Monitor urine specific gravity, serum osmolarity and serum sodium as indicated or ordered  - Monitor response to interventions for patient's volume status, including labs, urine output, blood pressure

## 2020-06-09 NOTE — CM/SW NOTE
Care Progression Note:  Active Acute Medical Issue:   Weakness generalized     Other Contributing Medical Factors/Dx. : Acute encephalopathy (alcohol-induced), failure to thrive, anemia, UTI~ urine culture pending,     Length of stay: 14  Avoidable Delays:

## 2020-06-09 NOTE — CM/SW NOTE
11:47am  MSW spoke to Chester Valles [phone] about [facility] PRICE- Insurance pending. MSW called Pt's Dtr Jose Sharma [phone] and updated her on above.

## 2020-06-09 NOTE — PLAN OF CARE
Assumed pt care @ 0730. Alert to self. Glasses. Bruise to right cheekbone. Generalized bruising to extremities. VSS. Denies pain. On room air. Non-productive cough at times. Poor appetite. Encouraged eating in addition to supplements.  Electrolytes replace

## 2020-06-10 NOTE — PROGRESS NOTES
NURSING DISCHARGE NOTE    Discharged Rehab facility via Ambulance. Accompanied by Support staff  Belongings Taken by patient/family. Patient discharge to [address] [place] [address]. Discharge paperwork given to patient.   All prescriptions have been printed by MD. Polina Arcos

## 2020-06-10 NOTE — CM/SW NOTE
9:28am  MSW spoke to Janay Luo [phone] about [facility] PRICE- Insurance pending. MSW called Pt's Dtr Joby Jerry [phone] and updated her on above and agreeable to dc. Janay Luo was made aware that patient may come with [facility] and she was okay with that.  MSW told emma

## 2020-06-10 NOTE — PROGRESS NOTES
[facility]  Progress Note     Lynn Rodriguez Patient Status:  Inpatient    1955 MRN [id number]   [facility] 3NE-A Attending Nirmal Weiss MD   Hosp Day # 13 PCP Serenity Joyce MD     Chief Complaint: urinary retention acid  1 mg Oral Daily   • umeclidinium-vilanterol  1 puff Inhalation Daily     ASSESSMENT / PLAN:   1. Acute encephalopathy, CT x 2 neg, UA & Utox neg, Ammonia low, etoh neg - suspect all related to alcohol-induced encephalopathy, now with UTI  1.  PT/OT >

## 2020-06-10 NOTE — PAYOR COMM NOTE
--------------  CONTINUED STAY REVIEW    Payor: JASSI PEREZ  Subscriber #:  [id number]  Authorization Number: [id number]          6/9 HOSP    S: Patient laying in bed, watching tv. Calm, pleasant.  No complaints.     Review of Systems:   Limited d/t patient AST             6/9 SW    11:47am  MSW spoke to [facility] [phone] about [facility] PRICE- Insurance pending. MSW called Pt's Dtr Immanuel Lugo [phone] and updated her on above.                  MEDICATIONS ADMINISTERED IN LAST 1 DAY:  CefTRIAXone Sodium (ROCEPHIN) Oral Maria Luisa Elam RN    6/9/2020 0815 Given 40 mEq Oral PapaMaria Luisa horton RN      Senna (SENOKOT) tab 8.6 mg     Date Action Dose Route User    6/9/2020 0804 Given 8.6 mg Oral PapaMaria Luisa avila RN      0.9% NaCl infusion     Date Action Dose Route User    6

## 2020-06-10 NOTE — PROGRESS NOTES
Ann Olivares Author: Markie Soto MD     1955 MRN [id number]   [facility]      Last Hospital Discharge 6/10/20 PCP Marsha Mckee 15 of Discharge  [facility]        CC --admitted to Located within [facility] by Kathy Dorsey MD at [facility] MAIN OR     Family History   Problem Relation Age of Onset   • Cancer Mother 61        esophogeal    • Cancer Paternal Grandmother 48        colon   • Colon Cancer Paternal Grandmother      Social History    Tobacco Use      Smo neck pain, running nose, headaches or swollen glands. Skin: No rashes, pruritus or skin changes,  Respiratory: Denies cough, wheezing or shortness of breath. CV: Denies chest pain, palpitations, orthopnea, PND or dizziness.   Musculoskeletal: No joint thao gait      • - Comprehensive subacute rehab with:  - PT to work on ambulation, gait, balance, strength, endurance, transfers, safety  - OT to work on fine motor skills, ADLs, hygiene, toileting, transfers, safety  - 24h nursing for medication administration

## 2020-06-10 NOTE — PLAN OF CARE
Assumed patient care @2021. Patient a&ox1-2. On RA. No tele. Electrolyte protocol. Positive UTI- IV rocephin, transition to po abx on dc. Stage 2 wound on sacrum, open to air.   redness to groin, applied nystatin cream.  Plan to dc to [facility] [place] interventions   Outcome: Progressing     Problem: RESPIRATORY - ADULT  Goal: Achieves optimal ventilation and oxygenation  Description  INTERVENTIONS:  - Assess for changes in respiratory status  - Assess for changes in mentation and behavior  - Position t

## 2020-06-11 NOTE — PROGRESS NOTES
Bryson Lund Author: Symone Graves MD     1955 MRN [id number]   [facility]  Admission 20      Last Hospital Discharge 6/10/20 PCP Marsha Peraza 15 of Discharge  [facility]       Patient admitted [facility] under my c

## 2020-06-11 NOTE — DISCHARGE SUMMARY
[facility]  DISCHARGE SUMMARY     Neelima Comment Patient Status:  Inpatient    1955 MRN [id number]   [facility] 3NE-A Attending No att. providers found   [facility] Day # 13 PCP Rod Espinoza MD     Date of Admission: 2020 [facility].    History of Present Illness: Juana Voss is a 59year old female presents with generalized weakness. Is been progressing for weeks. She feels short of breath. She denies fevers or chills or productive cough.   She has been isolated from discharge and cleared by the consultants.   Patient was discharged in stable condition    Procedures during hospitalization:   • EGD    Incidental or significant findings and recommendations (brief descriptions):  • Per Brief Synopsis of Hospital Course Bromide-Olodaterol 2.5-2.5 MCG/ACT Aers  Commonly known as:  Stiolto Respimat      Inhale 2 puffs into the lungs daily.    Quantity:  3 Inhaler  Refills:  3           Where to Get Your Medications      These medications were sent to [facility] #04

## 2020-06-11 NOTE — PAYOR COMM NOTE
--------------  DISCHARGE REVIEW    Payor: JASSI PEREZ  Subscriber #:  [id number]  Authorization Number: [id number]    Admit date: 5/26/20  Admit time:  9321  Discharge Date: 6/10/2020  1:30 PM     Admitting Physician: Trev Andrade MD  Attending Mu Contreras

## 2020-06-13 PROBLEM — R41.0 CONFUSION: Status: ACTIVE | Noted: 2020-01-01

## 2020-06-13 PROBLEM — R41.82 ACUTE ALTERATION IN MENTAL STATUS: Status: ACTIVE | Noted: 2020-01-01

## 2020-06-13 NOTE — ED INITIAL ASSESSMENT (HPI)
Pt here via ems after #RN at nursing home called 911 because pt would not allow nurse to give her a nebulizer treatment and would not go to dinner. Pt has been at nursing facility x 3d. Juany mora.

## 2020-06-14 NOTE — ED NOTES
Call to inpt floor = sitter arrived and family updated. No changes in pt's assessment. Pt remains tensing up with b/p cuff in use.

## 2020-06-14 NOTE — H&P
[facility]  History and Physical     Shaista Spear Patient Status:  Emergency    1955 MRN [id number]   Location [address] Attending No att. providers found   [facility] Day # 0 PCP Stone Vernon MD     Chief Com cigarettes. She started smoking about 47 years ago. She has been smoking about 1.00 pack per day. She has never used smokeless tobacco. She reports current alcohol use of about 3.0 standard drinks of alcohol per week.  She reports that she does not use drug 21.66 kg/m²   General: No acute distress. Alert and confused  HEENT: Normocephalic atraumatic. Moist mucous membranes. EOM-I. PERRLA. Anicteric. Neck: No lymphadenopathy. No JVD. No carotid bruits. Respiratory: Clear to auscultation bilaterally.  No wheez PCR  2. UTI -catheter associated-POA  1. ?  Failed cefdinir  2. Repeat urine culture. Previous culture with Christine and Hafnia (R ancef)  3. Possible right lower lobe pneumonia/gram-negative pneumonia  1. IV Zosyn  2. Sputum culture if able  3.  Speech the

## 2020-06-14 NOTE — CM/SW NOTE
Pt is a 60 yo female admitted for confusion. Pt is a readmission. Pt usually lives with her  and has been independent for her adls and normally walks unassisted up until the last couple of months. Pt has a history of ETOH abuse.   Pt went to the

## 2020-06-14 NOTE — ED NOTES
Pt arrived with a clean depends and lang intact. +redness to buttocks pt yells when attempted to roll patient to apply chux.

## 2020-06-14 NOTE — SLP NOTE
Spoke with RN earlier this morning and again now and patient mental status not appropriate for evaluation. Will hold today and check status tomorrow.     Harmeet Lockett Reji 87 CCC-SLP  Pager [phone]

## 2020-06-14 NOTE — PHYSICAL THERAPY NOTE
PHYSICAL THERAPY EVALUATION - INPATIENT     Room Number: 3872/0144-K  Evaluation Date: 6/14/2020  Type of Evaluation: Initial  Physician Order: PT Eval and Treat    Presenting Problem: AMS  Reason for Therapy: Mobility Dysfunction and Discharge Plann strength in last few months and has just been transferring to a w/c at home. Pt reports that she typically is independent w/ adl's and gait w/o device, but is questionable in her reliability.     PT session at [facility] on 6/6- Pt was able to transfer to chair w the side of the bed?: Unable   How much help from another person does the patient currently need. ..   -   Moving to and from a bed to a chair (including a wheelchair)?: Total   -   Need to walk in hospital room?: Total   -   Climbing 3-5 steps with a raili with anxiety and depression, acute cognitive impairment induced by ETOH per Neuropysch testing on 6/1/20.  In this PT evaluation, the patient presents with the following impairments cognitive impairments, inability to follow commands, strength deficits, bal

## 2020-06-14 NOTE — ED NOTES
Call to nh = [facility] [place]: Ramiro Nice states she met pt at 1500 and she was thrashing/moaning and would not eat. Bruise on right face was there on arrival to nh.  Juany came from pre nh.

## 2020-06-14 NOTE — PROGRESS NOTES
PCR Covid negative, rapid covid negative.  Confirmed with Jerad Garcia, RN at [facility] NO positive cases of COVID in facility, also confirmed with nursing supervisor on list of congregate facilities with active cases-[facility] not on there, ok to d'c isolation

## 2020-06-14 NOTE — CONSULTS
[facility]                       Gastroenterology [address] [facility] Gastroenterology    Lynn Rodriguez Patient Status:  Inpatient    1955 MRN [id number]   [facility] 3NE-A Attending Nirmal Weiss MD   [address] [facility] (APRESOLINE) injection 10 mg, 10 mg, Intravenous, Q4H PRN  metoprolol Tartrate (LOPRESSOR) 5 MG/5ML injection 5 mg, 5 mg, Intravenous, Q6H  [COMPLETED] Magnesium Sulfate IVPB premix SOLN 2 g, 2 g, Intravenous, Once  [] 0.9% NaCl infusion, , Intraven Results   Component Value Date    WBC 13.5 06/14/2020    HGB 10.3 06/14/2020    HCT 32.3 06/14/2020    .0 06/14/2020    CREATSERUM 0.54 06/14/2020    BUN 3 06/14/2020     06/14/2020    K 4.4 06/14/2020     06/14/2020    CO2 23.0 06/14/20 of aging. SINUSES:           No sign of acute sinusitis. MASTOIDS:          Opacification of the right mastoid is unchanged   SKULL:             No depressed calvarial fracture. CONCLUSION:  No CT evidence for acute intracranial process.      Diff

## 2020-06-14 NOTE — PROGRESS NOTES
[facility]  Progress Note     Modesto Montoya Patient Status:  Inpatient    1955 MRN [id number]   [facility] 3NE-A Attending Malik May MD   Hosp Day # 1 PCP Gildardo Bhat MD     Chief Complaint: AMS   S:  Only resp Results   Component Value Date    TSH 18.100 06/14/2020    T4F 0.9 06/14/2020     Imaging: Imaging data reviewed in Epic.   Medications:   • pantoprazole (PROTONIX) IV push  40 mg Intravenous Q12H   • metoprolol Tartrate  5 mg Intravenous Q6H   • magnesium

## 2020-06-14 NOTE — PLAN OF CARE
Assumed care of the patient @ 07:00, resting in bed. Non-verbal at this time, moans to speech, followed commands to open eye only. Vitals stable, on 2L NC. Neuro consult called. Sitter at bedside. NPO. IV patent, SR on tele.  Isolation precautions maintaine

## 2020-06-14 NOTE — ED NOTES
Call to [address] [place] [address] for room [phone] = report received and awaiting sitter. Floor to call us back when sitter arrives.

## 2020-06-14 NOTE — ED PROVIDER NOTES
Patient Seen in: [facility] Emergency Department      History   Patient presents with:  Altered Mental Status  Other    Stated Complaint: noncompliant with neb    HPI    28-year-old female was sent to the emergency department from a local extended c complaint: noncompliant with neb  Other systems are as noted in HPI. Constitutional and vital signs reviewed. All other systems reviewed and negative except as noted above.     Physical Exam     ED Triage Vitals [06/13/20 1901]   BP (!) 180/159   Puls normal limits. Increasing atelectasis versus infiltrate in the right lung base with increased blunting the right costophrenic angle. Improved aeration left base. Pleural thickening/trace fluid along the right minor fissure. No pneumothorax.

## 2020-06-14 NOTE — CONSULTS
[facility]   Neurology; INITIAL Consultation VISIT  2020, 6:13 PM     Zenvicente Strongcelina Patient Status:  Inpatient    1955 MRN [id number]   [facility] 3NE-A Attending Tarik Pierce MD     PCP Sherman Bustamante reports that she does not use drugs. ALLERGIES:  No Known Allergies    MEDICATIONS:  No current facility-administered medications on file prior to encounter.    metoprolol Tartrate 25 MG Oral Tab, Take 1 tablet (25 mg total) by mouth 2x Daily(Beta Blocke moaning and not answering or following   CN:  Pupils mid size, reactive but hard to determine due to active closure of eyes.   Eye movement tests cannot be done,   Face symmetric  Motor:  Intermittent irregular jerking consistent with myoclonus  DTRs presen Crystals Rare (A) None Seen /LPF    Hyaline Casts Present (A) None Seen /LPF    Mucous Urine 2+ (A) None Seen    Yeast Urine None Seen None Seen    WBC Clump Present (A) None Seen   ETHYL ALCOHOL    Collection Time: 06/13/20  8:10 PM   Result Value Ref Ran Slide reviewed, see previous RBC morphology.     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

## 2020-06-14 NOTE — PROGRESS NOTES
06/14/20 9336   Clinical Encounter Type   Visited With Patient not available   Continue Visiting Yes

## 2020-06-14 NOTE — ED NOTES
Pt arrived to er with dried food in mouth= increase agitation with wiping mouth. Two assist for RN getting iv access and labs. To ct via cart. Spouse arrived at bs and awaiting  and ermd updates.  Pt remains agitated,thrashing (all four ext) and moa

## 2020-06-14 NOTE — PROGRESS NOTES
NURSING ADMISSION NOTE      Patient admitted via Cart  Oriented to room. Safety precautions initiated. Bed in low position. Call light in reach.       Patient is A&Ox1; nonverbal  Moans  Sitter at bedside for safety   was called for an update

## 2020-06-14 NOTE — ED NOTES
Ed hospitalist at  with poc with pt's spouse and daughter. Pt remains agitated /moaning while people in room.

## 2020-06-15 PROBLEM — G40.901 SUBCLINICAL STATUS EPILEPTICUS (HCC): Status: ACTIVE | Noted: 2020-01-01

## 2020-06-15 NOTE — PROGRESS NOTES
At approx 10am, RRT called r/t low BP's and increased lethargy. Interventions performed, pt stabilized, and then safely transferred to the 6th floor. Report given to RN. Family to be updated.

## 2020-06-15 NOTE — PROGRESS NOTES
[facility]  Neuro Critical Care Progress Note    Yue Hidalgo Patient Status:  Inpatient    1955 MRN [id number]   [facility] 6NE-A Attending Darryl Kelsey MD   Hosp Day # 2 PCP Sotero Petersen MD     Subjective:   The patie **FOLLOWED BY** Thiamine HCl 100 mg in sodium chloride 0.9% 50 mL IVPB, 100 mg, Intravenous, G74O  •  folic acid (FOLVITE) tab 1 mg, 1 mg, Oral, Daily  •  nystatin (MYCOSTATIN) 669507 UNIT/GM cream 1 Application, 1 Application, Topical, PRN  •  berkley on my review and there were no clinical seizures that were observed. -Continue with thiamine. -PT OT ST when appropriate. Cardiac:  -Currently hypotensive and shock likely septic in nature she is on Levophed.   May be medication related as well she has

## 2020-06-15 NOTE — PROGRESS NOTES
[facility]                                       Gastroenterology Progress Note  Fabricio Alcocer Patient Status:  Inpatient    1955 MRN [id number]   [facility] 3NE-A Attending Ashia Pond MD   Hosp Day # 2 PCP will need to discuss colonoscopy with patient. She is getting EEG. If this is going to be her baseline, then we have to have a discussion with her POA about a colonoscopy. For now, will await if mentation improves.  Please call back when this happens or if

## 2020-06-15 NOTE — PLAN OF CARE
Assumed care of patient from 3rd floor after RR called due to lethargy and hypotension. Patient lethargic but maintaining her airway. Responds to pain in all extremities but does not follow commands. EEG in place.  Per Dr. Sergio Pinto no evidence of seizures so indicated  - Manage/alleviate anxiety  - Monitor for signs/symptoms of CO2 retention  Outcome: Progressing     Problem: GASTROINTESTINAL - ADULT  Goal: Maintains adequate nutritional intake (undernourished)  Description  INTERVENTIONS:  - Monitor percentag assistive/communication devices  Outcome: Not Progressing

## 2020-06-15 NOTE — PROCEDURES
ELECTROENCEPHALOGRAM REPORT      Patient Name: Rosangela Hardy  Chart ID: [id number]  Ordering Physician:  Dr Christine Birmingham         Date of Test: 6/15/2020  Referring Physician:   Patient Diagnosis: Altered mental status    HISTORY  A 59year old female admitt [address]

## 2020-06-15 NOTE — PROGRESS NOTES
[facility]  Progress Note     Amy Ramirez Patient Status:  Inpatient    1955 MRN [id number]   [facility] 3NE-A Attending Karen Silva MD   Hosp Day # 2 PCP Sonny Montero MD     Chief Complaint: hypoxia/AMD    Elvin FlavinSara Barron 06/13/20 2010 06/14/20  0601   GLU 89  --  72 88   BUN 2*  --  2* 3*   CREATSERUM 0.35*  --  0.46* 0.54*   GFRAA 133  --  122 115   GFRNAA 115  --  105 100   CA 7.0*  --  8.6 8.4*   ALB  --   --  1.9* 1.8*     --  143 143   K 3.3* 4.2 4.7 4.4   CL 1 hyopvolemic- IVF, pressors  5. History of EtOH abuse  6. Recent UGI bleed status post EGD with esophagitis and duodenitis  1. PPI and GI following  7. MITZY, hgb stable  8. Hypertension  1. Hold BB IV  9. NSVT  10. Recent pleural effusion s/p Thora 5/28  11.

## 2020-06-15 NOTE — OCCUPATIONAL THERAPY NOTE
OT order received and patient chart reviewed. Attempted to see pt this AM. Per RN, pt is only responding to painful stimuli and not appropriate for therapy this day. OT will continue to follow and re-attempt pt at later time as pt becomes more appropriate.

## 2020-06-15 NOTE — DIETARY NOTE
[facility]    NUTRITION ASSESSMENT    Pt meets moderate malnutrition criteria.     CRITERIA FOR MALNUTRITION DIAGNOSIS:  Criteria for non-severe malnutrition diagnosis: acute illness/injury related to energy intake less than 75% for greater than 7 days This is 108 % of IBW  BMI: Body mass index is 21.39 kg/m².   IBW: 47.7 kg    WEIGHT HISTORY:   Wt Readings from Last 12 Encounters:  06/14/20 : 51.3 kg (113 lb 3.2 oz)  05/26/20 : 52.2 kg (115 lb)  03/05/20 : 52.2 kg (115 lb)  10/30/19 : 53.5 kg (118 lb)  0

## 2020-06-15 NOTE — PROGRESS NOTES
[address]  Neurology Progress Note    Fawn Mathew Patient Status:  Inpatient    1955 MRN [id number]   [facility] 3NE-A Attending Rukhsana Ramos MD   Hosp Day # 2 PCP Swati Castaneda MD       [facility]      Neuro height 5' 1\" (1.549 m), weight 113 lb 3.2 oz (51.3 kg), SpO2 95 %, not currently breastfeeding. Physical Exam:  Neurologic: Exam limited. Patient will open eyes briefly when name called, but no attempt to speak. PERRL.   No forced gaze deviation noted

## 2020-06-15 NOTE — PLAN OF CARE
Patient opens eyes to speech, only verbal response is moaning. IAIN orientation, patient unable to follow commands. Jerking movements noted. On 2L of oxygen, lungs diminished with expiratory wheezes. Abdomen soft, BS active.  Remains NPO d/t mental status- S Provide Smoking Cessation handout, if applicable  - Encourage broncho-pulmonary hygiene including cough, deep breathe, Incentive Spirometry  - Assess the need for suctioning and perform as needed  - Assess and instruct to report SOB or any respiratory diff and document risk factors for pressure ulcer development  - Assess and document skin integrity  - Monitor for areas of redness and/or skin breakdown  - Initiate interventions, skin care algorithm/standards of care as needed  Outcome: Progressing     Proble self-care  Outcome: Progressing     Problem: Delirium  Goal: Minimize duration of delirium  Description  Interventions:  - Encourage use of hearing aids, eye glasses  - Promote highest level of mobility daily  - Provide frequent reorientation  - Promote wa

## 2020-06-15 NOTE — PAYOR COMM NOTE
--------------  ADMISSION REVIEW     Payor: [employer] STARK #:  [id number]  Authorization Number: [id number]    Admit date: 6/13/20  Admit time: 2322       Admitting Physician: Richard Griffin MD  Attending Physician:  Nellie Colón MD  Primary Car Social History    Tobacco Use            Smokeless tobacco: Never Used          Drug use: No             Review of Systems    Positive for stated complaint: noncompliant with neb  Other systems are as noted in HPI.   Constitutional and vital si RAPID SARS-COV-2 BY PCR   CBC W/ DIFFERENTIAL          Chest x-ray reveals the following findings:    CONCLUSION:  Heart size and pulmonary vascularity is within normal limits.      Increasing atelectasis versus infiltrate in the right lung base with increa Related Notes:  Original Note by Roberta Freedman MD (Physician) filed at 2020 10:15 PM           [facility]  History and Physical     [place] Patient Status:  Emergency    1955 MRN [id number]   [facility] [address] Social History:   She has never used smokeless tobacco. She reports that she does not use drugs.     Family History:   Family History   Problem Relation Age of Onset   • Cancer Mother 61        esophogeal    • Cancer Paternal Grandmother 47        colon Neck: No lymphadenopathy. No JVD. No carotid bruits. Respiratory: Clear to auscultation bilaterally. No wheezes. No rhonchi. Cardiovascular: S1, S2. Regular rate and rhythm. No murmurs, rubs or gallops. Equal pulses.    Chest and Back: No tenderness or de 1. IV Zosyn  2. Sputum culture if able  3. Speech therapy, eval for aspiration  4. Dehydration  1. IV fluids  5. Recent UGI bleed status post EGD with esophagitis and duodenitis-PPI  6. Iron deficiency anemia-hemoglobin stable  7. Hypertension  8. NSVT  9. 6/15/2020 0106 Given 40 mg Intravenous Sonu Harrington, RN      Piperacillin Sod-Tazobactam So (ZOSYN) 3.375 g in dextrose 5 % 100 mL ADD-vantage     Date Action Dose Route User    6/15/2020 1356 New Bag 3.375 g Intravenous Emeka Wolfe RN    6/15/2020 0 HPI: 60 yo F  known to our service from recent admission for evaluation of anemia, who is brought in with mental status changes, encephalopathy. Also found to have UTI/possible pneumonia.  Recently hospitalized 3 weeks ago with profound anemia, hgb 6.3 (mireya nystatin (MYCOSTATIN) 576355 UNIT/GM cream 1 Application, 1 Application, Topical, PRN  Senna (SENOKOT) tab 8.6 mg, 8.6 mg, Oral, BID  umeclidinium-vilanterol (ANORO ELLIPTA) 62.5-25 MCG/INH inhaler 1 puff, 1 puff, Inhalation, Daily  [COMPLETED] Piperacilli   AST 69 06/14/2020     ALT 43 06/14/2020     PTT 32.1 06/14/2020     INR 1.07 06/14/2020     PTP 14.2 06/14/2020     MG 1.6 06/14/2020             Recent Labs   Lab 06/09/20  0624 06/09/20  1622 06/13/20 2010 06/14/20  0601   GLU 89  --  72 88   BUN 2* There is fluid in the right mastoid air cells.        Dictated by: Mora Mendoza MD on 6/13/2020 at 9:40 PM       Finalized by: Mora Mendoza MD on 6/13/2020 at 9:43 PM               Impression:   1. 60 y/o female  admitted with altered mentation, unresp She was recently discharged after presenting with profound anemia, ultimately EGD done showed severe esophagitis.   She was discharged a few days ago and apparently was more conversant and now readmitted with lethargy just moaning and not answering appropri folic acid 1 MG Oral Tab, Take 1 tablet (1 mg total) by mouth daily. ipratropium-albuterol 0.5-2.5 (3) MG/3ML Inhalation Solution, Take 3 mL by nebulization every 6 (six) hours while awake.   Pantoprazole Sodium 40 MG Oral Tab EC, Take 1 tablet (40 mg tota   Potassium 4.7 3.5 - 5.1 mmol/L     Chloride 112 98 - 112 mmol/L     CO2 25.0 21.0 - 32.0 mmol/L     Anion Gap 6 0 - 18 mmol/L     BUN 2 (L) 7 - 18 mg/dL     Creatinine 0.46 (L) 0.55 - 1.02 mg/dL     BUN/CREA Ratio 4.3 (L) 10.0 - 20.0     Calcium, Total 8   Benzodiazepines Urine Negative Negative     Cocaine Urine Negative Negative     Cannabinoid Urine Negative Negative     Ecstasy Urine Negative Negative     Opiate Urine Negative Negative     Oxycodone Urine Negative Negative     Creatinine Ur Random 29. 6   Hold Blue Auto Resulted     SARS-COV-2 BY PCR     Collection Time: 06/13/20  9:30 PM   Result Value Ref Range     SARS-CoV-2 (COVID-19) Not Detected Not Detected   RAINBOW DRAW LIGHT GREEN     Collection Time: 06/14/20 12:53 AM   Result Value Ref Range Result Value Ref Range     WBC 13.5 (H) 4.0 - 11.0 x10(3) uL     RBC 3.11 (L) 3.80 - 5.30 x10(6)uL     HGB 10.3 (L) 12.0 - 16.0 g/dL     HCT 32.3 (L) 35.0 - 48.0 %     .0 150.0 - 450.0 10(3)uL     .9 (H) 80.0 - 100.0 fL     MCH 33.1 26.0 - 34 Detailed Report      Chart Review: Note Routing History     No routing history on file.    Sarah Alvarez MD   Physician   Neurology   Procedures   Signed   Date of Service:  6/15/2020  9:23 AM            Procedure Orders   EEG Once [[id number]] ordered 1. Frequent sharp/spike discharges noted in the bilateral anterior leads, maximum over F4-C4 and C4-P4 leads indicative of epileptiform activity in those areas and high susceptibility for subclinical seizures      2.  Diffuse background slowing in theta-del A rapid response was called and the patent was started on levo and transferred to the ICU for further care.   Currently she is somnolent though will respond to painful stimuli.          Past Medical History:   Diagnosis Date   • COPD (chronic obstructive pu Tiotropium Bromide-Olodaterol (STIOLTO RESPIMAT) 2.5-2.5 MCG/ACT Inhalation Aero Soln, Inhale 2 puffs into the lungs daily. , Disp: 3 Inhaler, Rfl: 3, 5/25/2020 at 0900         No Known Allergies     Social History:    Social History       Socioeconomic His metoprolol Tartrate 25 MG Oral Tab, Take 1 tablet (25 mg total) by mouth 2x Daily(Beta Blocker) for 60 doses. , Disp: 60 tablet, Rfl: 0  nystatin 526357 UNIT/GM External Cream, Apply 1 Application topically as needed for Dry Skin., Disp: 1 Tube, Rfl: 0  Sen Temp: 97.6 °F (36.4 °C) 97.1 °F (36.2 °C)   97.4 °F (36.3 °C)   TempSrc: Axillary Axillary   Oral   SpO2:   94% 95% 98%   Weight:           Height:                    Oxygen Therapy  SpO2: 98 %  O2 Device: Nasal cannula  O2 Flow Rate (L/min): 2 L/min  Puls Imaging: chest x-rays reviewed- film from today shows increased bilateral infiltrates     ASSESSMENT/PLAN:  1. Shock  -septic vs distributive from ?seizure  -wean Levo as tolerated  -IVF prn  2.  Sepsis  -suspect PNA given x-ray  -cont levo  -monitor BP thr A 3 hours and 30 minutes ambulatory EEG was obtained. There was no sedation given. The routine 16 channel EEG was performed with bipolar connections using an anterior to posterior double-banana montage.   Seizure software stored files described as [employer]

## 2020-06-15 NOTE — SLP NOTE
Attempted to perform bedside swallowing evaluation. Patient seated upright in chair mode in bed. She opened her eyes in response to loud noise. She was moaning and mumbling while this writer was in the room.  Unable to arouse patient in order to perform a b

## 2020-06-15 NOTE — PLAN OF CARE
I updated pt spouse and dtr over the phone. We reviewed w/u from last admission and this admission. They report she has progressively gotten worse while at rehab.   They were hoping to find the answer to her deterioration over the past few weeks and feel t

## 2020-06-15 NOTE — PROCEDURES
CONTINUOUS ELECTROENCEPHALOGRAM REPORT    Patient Name: Akira Monson  Chart ID: [id number]  Ordering Physician: Javid Gomez Study Date: 6/15/20  Patient Diagnosis: Encephalopathy    TECHNICAL SUMMARY:  A 3 hours and 30 minutes ambulatory EEG was obta

## 2020-06-16 NOTE — PLAN OF CARE
Assumed care of patient at 0730. Patient lethargic but is starting to open her eyes when her name is called. Patient with thick tan secretions requiring frequent oral suctioning. VSS, NSR on the monitor. Levo gtt titrated off while keeping SBP >90.  Dobhoff imbalances  - Administer electrolyte replacement as ordered  - Monitor response to electrolyte replacements, including rhythm and repeat lab results as appropriate  - Fluid restriction as ordered  - Instruct patient on fluid and nutrition restrictions as a

## 2020-06-16 NOTE — CONSULTS
[facility] [address]  [id number]  Hospital Day #3  Date of Consult:  6/16/2020        Reason for Consultation:      Consult requested by Dr. Tammi Perez for evaluation of palliative care needs, goals of car OR         Social History:      Marital Status:     Living Situation Prior to Hospitalization: was home until her recent hospitalization in May/ June when she was discharged to [facility] [place] [place] by [facility].   Does Patient Live Alone: no  Is Patient Confused: yes      Function 06/16/2020       Coags:  Lab Results   Component Value Date    INR 1.07 06/14/2020    PTT 32.1 06/14/2020     Chemistry:  Lab Results   Component Value Date    CREATSERUM 0.61 06/16/2020    BUN 8 06/16/2020     06/16/2020    K 3.8 06/16/2020    CL 11 4. Interstitial opacities bilaterally likely representing edema. 5. Rounded calcification within the right abdomen likely corresponds with known cholelithiasis.            Dictated by: Mady Ramirez MD on 6/16/2020 at 9:37 AM       Finalized by: Angela Waller Care: When I entered the room Margie Luther was unable to participate in goals of care. I attempted to reach the  to introduced palliative care and reason for consultation, there was no answer. I left my name and return number.        HCPOA surrogate: Georges

## 2020-06-16 NOTE — PAYOR COMM NOTE
--------------  PLEASE FAX INPT DAYS AUTHORIZED ALONG WITH NRD -[phone]    [facility] YOU    6/16 CONTINUED STAY REVIEW    Payor: [facility]  Subscriber #:  [id number]  Authorization Number: [id number]    6/15  Naresh Gold RN   Registered Nurse   Prakash Mei Weight:   119 lb 4.3 oz (54.1 kg)       Height:                    Oxygen Therapy  SpO2: 98 %  O2 Device: Nasal cannula  O2 Flow Rate (L/min): 2 L/min  Pulse Oximetry Type: Continuous  Oximetry Probe Site Changed: No  Pulse Ox Probe Location: Right foot -no hypercapnea, no seizure activity  -will ask Endo to see to eval for myxedema coma  5. Anemia  -chronic  -recent GI bleed, GI following peripherally  -no signs of active blood loss currently  -cont to monitor and transfuse as needed for Hgb <7  6.  TCP 6/15/2020 1856 New Bag 5 mcg/min Intravenous Khai Terrance, NADIA    6/15/2020 1833 Rate/Dose Verify 5 mcg/min Intravenous Khai Terrance, NADIA    6/15/2020 1600 Rate/Dose Change 5 mcg/min Intravenous Khai Terrance, NADIA    6/15/2020 1400 Rate/Dose Agapito Lowers 6/15/2020 1030 New Bag 500 mL Intravenous Nii Harrington, RN      Thiamine HCl 100 mg in sodium chloride 0.9% 50 mL IVPB     Date Action Dose Route User    6/15/2020 1829 New Bag 100 mg Intravenous Latesha Holman RN

## 2020-06-16 NOTE — SLP NOTE
Spoke to [place]. Patient is not waking up in order to participate in a swallowing evaluation (3rd day). Will continue to follow in order to complete swallowing evaluation as patient is able and appropriate.     Gabby Rodriguez MA, Vishal Gonzalez  Speech Language Patho

## 2020-06-16 NOTE — PLAN OF CARE
Assumed patient care this PM. Patient lethargic and does not follow commands but does local and withdraws to pain in all extremities. See neuro. Thick, tan secretions suctioned from patients mouth. Levo gtt to keep SBP >90.  Harrington in place with minimal outpu

## 2020-06-16 NOTE — PROGRESS NOTES
[facility]  Progress Note     Modesto Montoya Patient Status:  Inpatient    1955 MRN [id number]   [facility] 3NE-A Attending Malik May MD   Hosp Day # 3 PCP Gildardo Bhat MD     Chief Complaint: hypoxia/AMD    Deann Hedrick displayed.        Recent Labs   Lab 06/15/20  0627 06/15/20  1056 06/16/20  0534   GLU 97 102* 132*   BUN 8 8 8   CREATSERUM 0.74 0.70 0.61   GFRAA 99 106 111   GFRNAA 86 92 96   CA 8.3* 7.9* 8.3*   ALB 1.5* 1.4* 1.4*    145 145   K 3.6 3.5 3.8   CL 1 with bloody secretion post dobhoff insertion  2. Trend hgb q8hrs - updated GI today, continue PPI BID  9. MITZY, hgb stable >8  10. Hypertension, currently low  11. NSVT  12. Recent pleural effusion s/p Thora 5/28  13.  Chronic bronchiectasis, COPD, stable  1

## 2020-06-16 NOTE — PROGRESS NOTES
06/16/20 0859   Clinical Encounter Type   Visited With Health care provider   Referral To Nurse  (Per nurse, patient not appropriate for completion of Advance Directive/HPOA form.   remains available at pager [phone].)

## 2020-06-16 NOTE — PROGRESS NOTES
[facility]                                       Gastroenterology Progress Note  Bruce Graham Patient Status:  Inpatient    1955 MRN [id number]   [facility] 3NE-A Attending Monty Acharya MD   Hosp Day # 3 PCP Anemia-sub-acute in nature. Improved from recent admit. EGD with severe esophagitis. Cannot r/o colonic source of bleeding, patient previously refusing this  3.  Alcoholic liver disease-has hepatomegaly on US, with normal platelets, normal splenic size, and

## 2020-06-16 NOTE — OCCUPATIONAL THERAPY NOTE
OCCUPATIONAL THERAPY QUICK EVALUATION - INPATIENT    Room Number: 4602/5457-F  Evaluation Date: 6/16/2020     Type of Evaluation: Initial  Presenting Problem: Confusion    Physician Order: IP Consult to Occupational Therapy  Reason for Therapy:  ADL/IADL D 5/28/20: Pt lives in two story home w/ her . Luis A Smith had declining strength in last few months and has just been transferring to a w/c at home.  Pt reports that she typically is independent w/ adl's and gait w/o device, but is questionable in her relia of Session: In bed;Needs met;Call light within reach; All patient questions and concerns addressed; Alarm set    ASSESSMENT     Patient currently does not meet criteria for skilled inpatient Occupational Therapy services, however patient will remain on IP Re

## 2020-06-16 NOTE — PROGRESS NOTES
Critical Care Progress Note        NAME: Joycelyn Marquez - ROOM: 9921/5936-Z - MRN: [id number] - Age: 59year old - : 1955  Date of Admission: 2020  6:49 PM  Admission Diagnosis: Confusion [R41.0]      Last 24hrs: No events overnight, slightl soft, non-tender; bowel sounds normal; no masses,  no organomegaly  Extremities: extremities normal, atraumatic, no cyanosis or edema      Labs reviewed as noted below        ASSESSMENT/PLAN:  1.  Shock  -septic vs distributive from ?seizure  -wean Levo as

## 2020-06-17 NOTE — PROGRESS NOTES
[facility]  Progress Note     Rosangela Hardy Patient Status:  Inpatient    1955 MRN [id number]   [facility] 3NE-A Attending Farnaz Varela MD   Hosp Day # 4 PCP Sona Martin MD     Chief Complaint: hypoxia/AMD    S: Patie 06/16/20  0534 06/17/20  0529   GLU 97 102* 132* 132*   BUN 8 8 8 6*   CREATSERUM 0.74 0.70 0.61 0.66   GFRAA 99 106 111 108   GFRNAA 86 92 96 94   CA 8.3* 7.9* 8.3* 8.0*   ALB 1.5* 1.4* 1.4*  --     145 145 144   K 3.6 3.5 3.8 3.6   * 118* 119 esophagitis/duodenitis  2. Had bloody secretion post dobhoff insertion, monitor  3. Trend hgb q8hrs, may need 1 unit, will follow  4. GI following and on PPI BID  10. MITZY, possible acute blood loss anemia, partly dilution  1.  Watch stools/suction output  2

## 2020-06-17 NOTE — PAYOR COMM NOTE
--------------  CONTINUED STAY REVIEW    Payor: JASSI PEREZ  Subscriber #:  [id number]  Authorization Number: [id number]    Admit date: 6/13/20  Admit time: [address] FOR 6/17/20 6/17/20    Chief Complaint: hypoxia/AMD     S: Cassie Parents 0.6 0.4 0.5  --    TP 4.5* 4.1* 4.4*  --       ASSESSMENT / PLAN:      1. Septic/hypovolemic shock, resolved  2.  Acute metabolic encephalopathy with tremors   1. 2/2 aspiration PNA, Previous alcohol induced encephalopathy but well out of the range of withd Action Dose Route     6/17/2020 0842 Given 1 mg Intravenous       furosemide (LASIX) injection 20 mg     Date Action Dose Route     6/17/2020 1311 Given 20 mg Intravenous       Levothyroxine Sodium tab 25 mcg     Date Action Dose Route     6/17/2020 0656 G

## 2020-06-17 NOTE — PROGRESS NOTES
[facility]  Endocrinology Progress Note    Fawn Mathew Patient Status:  Inpatient    1955 MRN [id number]   [facility] 6NE-A Attending Christina Marrufo MD   Hosp Day # 4 PCP Swati Castaneda MD     Subjective:  No changes to me 0.76 (L)   Cortisol Latest Units: ug/dL      19.9    ANTI-THYROGLOBULIN Latest Ref Range: <60 U/mL      <15    ANTI-THYROPEROXIDASE Latest Ref Range: <60 U/mL      44      Impression and Plan:    1. Altered Mental Status  2.  Abnormal Thyroid Function Test

## 2020-06-17 NOTE — PROGRESS NOTES
1 [facility]  Follow Up     Cat Mccall  [id number]  Hospital Day #4  Date of Consult: 06/16/20  Patient seen at: [facility]     Subjective:      Patient was seen and examined without family at the bedside.    Kay Castañeda (L) 06/17/2020    HCT 22.6 (L) 06/17/2020    .0 (L) 06/17/2020       Coags:  Lab Results   Component Value Date    INR 1.07 06/14/2020    PTT 32.1 06/14/2020     Chemistry:  Lab Results   Component Value Date    CREATSERUM 0.66 06/17/2020    BUN 6 ( work   Partial Assist Normal or Reduced Full or confused   40 Mainly in bed Extensive Disease  Can’t do any work Mainly Assist Normal or Reduced Full or confused   30 Bedbound Extensive Disease  Can’t do any work Max Assist  Total Care Reduced Drowsy/confu house since February. When she would eat she would have a bite of one pizza bite and say she was full. She did not want to talk about her health. She had been seen by a pulmonologist and given an inhaler that refused to use.  Mr. Ching Arce stated she had not beltran natural death. \"    The POLST discussion advanced to the difference between selective treatment vs comfort care  Selective treatment is appropriate for on going medical management and treatment for new potential symptoms or complications with avoidance of Dr. Damian Harmon on change in code status. A total of 70  minutes were spent on this consult; which included all of the following: direct face to face contact,  physical examination and >50% was spent counseling and coordinating care.   We will continue to

## 2020-06-17 NOTE — PROGRESS NOTES
10 Jeannine Diehl Patient Status:  Inpatient    1955 MRN [id number]   [facility] 6NE-A Attending Monika Sánchez MD   [facility] Day # 4 PCP Bandar Ferguson MD     Critical Care Progress Note     Date of Admission: 20 room air      Wt Readings from Last 3 Encounters:  06/17/20 : 125 lb (56.7 kg)  05/26/20 : 115 lb (52.2 kg)  03/05/20 : 115 lb (52.2 kg)       I/O last 3 completed shifts: In: 5567.3 [I.V.:4071. 3; NG/GT:1246; IV PIGGYBACK:250]  Out: 710 [Urine:710]  I/O t with worsening R perihilar infiltrate/consolidation, +/- volume OL  -monitor cultures, adjust as indicated  5.  Encephalopathy- likely TME with some background dementia- however, not improving despite aggressive supportive measures  -no signs of seizures on

## 2020-06-17 NOTE — PLAN OF CARE
Assumed pt care at [address]. Pt unable to follow commands, but moves extremities spontaneously (see flowsheet for more details). Pupils PERRL. Pt in NSR on tele, heart tones audible and pulses weak but palpable.  BP in the 80s, Levo restarted on 2mcg/min and ti strengthening/mobility  - Encourage toileting schedule  Outcome: Progressing     Problem: RESPIRATORY - ADULT  Goal: Achieves optimal ventilation and oxygenation  Description  INTERVENTIONS:  - Assess for changes in respiratory status  - Assess for changes document risk factors for pressure ulcer development  - Assess and document skin integrity  - Monitor for areas of redness and/or skin breakdown  - Initiate interventions, skin care algorithm/standards of care as needed  Outcome: Progressing     Problem: N self-care     Outcome: Progressing     Problem: Delirium  Goal: Minimize duration of delirium  Description  Interventions:  - Encourage use of hearing aids, eye glasses  - Promote highest level of mobility daily  - Provide frequent reorientation  - Promote

## 2020-06-17 NOTE — CONSULTS
[facility]      Endocrinology Consultation    Jose Tarunjosué Patient Status:  Inpatient    1955 MRN [id number]   [facility] 6NE-A Attending Mich Ashford MD   Hosp Day # 3 PCP Murray Rooney MD     Seen and examined around tablet (40 mg total) by mouth 2 (two) times daily before meals. , Disp: 90 tablet, Rfl: 1  Multiple Vitamins-Minerals (MULTIVITAL-M) Oral Tab, Take 1 tablet by mouth., Disp: , Rfl: , Past Week at Unknown time  Tiotropium Bromide-Olodaterol (STIOLTO RESPIMAT Tartrate (LOPRESSOR) 5 MG/5ML injection 5 mg, 5 mg, Intravenous, Q6H  •  [COMPLETED] Thiamine HCl 300 mg in sodium chloride 0.9% 50 mL IVPB, 300 mg, Intravenous, Once **FOLLOWED BY** Thiamine HCl 100 mg in sodium chloride 0.9% 50 mL IVPB, 100 mg, Intraveno Recent Labs     06/15/20  1010 06/15/20  1144 06/15/20  1717 06/16/20  0533 06/16/20  1217 06/16/20  1756   PGLU 110* 97 138* 129* 120* 153*      Ref.  Range 8/17/2018 11:36 8/14/2019 11:11 5/26/2020 11:36 6/14/2020 06:01 6/14/2020 18:45 6/15/2020 06: 1 hour after levothyroxine administration. 3. Septic Shock   4.  PNA   - Abx and pressor support per critical care service     I will continue to follow closely while the patient is in the hospital. Please feel free to contact me with any questions or c

## 2020-06-17 NOTE — PROGRESS NOTES
[facility]                                       Gastroenterology Progress Note  Elijah Grigsby Patient Status:  Inpatient    1955 MRN [id number]   [facility] 3NE-A Attending Yadira Oliver MD   Hosp Day # 4 PCP altered mental status with concern for TME  2. Anemia-sub-acute in nature. Improved from recent admit. EGD with severe esophagitis. Cannot r/o colonic source of bleeding, patient previously refusing this  3.  Alcoholic liver disease-has hepatomegaly on US,

## 2020-06-18 NOTE — PROGRESS NOTES
[facility]  Progress Note     Raina Rachel Patient Status:  Inpatient    1955 MRN [id number]   [facility] 3NE-A Attending Raphael Judge MD   Hosp Day # 5 PCP Marisa Perez MD     Chief Complaint: hypoxia/AMD    S: Patie values in this interval not displayed.        Recent Labs   Lab 06/15/20  1056 06/16/20  0534 06/17/20  0529 06/18/20  0525   * 132* 132* 120*   BUN 8 8 6* 8   CREATSERUM 0.70 0.61 0.66 0.72   GFRAA 106 111 108 102   GFRNAA 92 96 94 89   CA 7.9* 8.3* likely colonization-catheter associated - POA  2. Previous culture with Candida and Hafnia  8. History of EtOH abuse  9. Mild transaminitis resolved  10. Recent UGI bleed   1. status post EGD with esophagitis/duodenitis  2.  Trend hgb, given blood on 6/17,

## 2020-06-18 NOTE — DIETARY NOTE
[facility]    NUTRITION ASSESSMENT    Pt meets moderate malnutrition criteria.     CRITERIA FOR MALNUTRITION DIAGNOSIS:  Criteria for non-severe malnutrition diagnosis: acute illness/injury related to energy intake less than 75% for greater than 7 days lethargy and inability to participate in SLP eval. Pt experienced hypotension this AM and transferred to ICU. Septic shock vs. Sz. Intake during previous visit was variable, but able to drink some ONS and tolerate well.  Pt underwent neuropsych testing leta

## 2020-06-18 NOTE — PROGRESS NOTES
[facility]  Endocrinology Progress Note    Kenneth Avina Patient Status:  Inpatient    1955 MRN [id number]   [facility] 6NE-A Attending Kalyan Reyes MD   Hosp Day # 5 PCP Brenda Ramírez MD     Subjective:  More alert today 1.120 3.000 18.100 (H)      Thyroxine (T4) Latest Ref Range: 4.8 - 13.9 ug/dL     4.2 (L)     T3 TOTAL Latest Ref Range: 60 - 181 ng/dL     38 (L)     T3 FREE Latest Ref Range: 2.40 - 4.20 pg/mL       0.76 (L)   Cortisol Latest Units: ug/dL      19.9    AN

## 2020-06-18 NOTE — PLAN OF CARE
Assumed care of pt this am, pt has minimal spontaneous movement, withdraws to pain, does not follow commands. Levo on and off per orders. 1 unit PRBC given as ordered.  Harrington catheter removed and will follow bladder scan/straight cath protocol, per Dr Rajat Carpio

## 2020-06-18 NOTE — PROGRESS NOTES
[facility]                                       Gastroenterology Progress Note  Blanca Maria Patient Status:  Inpatient    1955 MRN [id number]   [facility] 3NE-A Attending Sabrina Breaux MD   Hosp Day # 5 PCP Anemia-sub-acute in nature. Improved from recent admit. EGD with severe esophagitis. Cannot r/o colonic source of bleeding, patient previously refusing this  3.  Alcoholic liver disease-has hepatomegaly on US, with normal platelets, normal splenic size, and

## 2020-06-18 NOTE — PAYOR COMM NOTE
--------------  PLEASE FAX INPT DAYS AUTHORIZED ALONG WITH NRD [phone]    [facility] YOU      6/18 CONTINUED STAY REVIEW    Payor: Shankar Mzea [employer] PPO  Subscriber #:  [id number]  Authorization Number: [id number]    Dinah Kim MD   Physician   Pulmonol Abdomen: soft, non-tender; bowel sounds normal; no masses,  no organomegaly  Extremities: extremities normal, atraumatic, no cyanosis or edema     Labs reviewed as noted below           ASSESSMENT/PLAN:  1.  Septic shock- secondary to PNA  -improved, off le [facility]                                       Gastroenterology Progress Note        Jamil Hsieh Patient Status:  Inpatient    1955 MRN [id number]   [facility] 3NE-A Attending Prashanth Chang MD   [facility] Day # 3. 58 y/o female with a h/o heavy alcohol use admitted with altered mental status with concern for TME  2. Anemia-sub-acute in nature. Improved from recent admit. EGD with severe esophagitis.  Cannot r/o colonic source of bleeding, patient previously refusi General: No acute distress, ill/pale appearing. 59year old female  HEENT: +dobhoff  Respiratory: CTA, on RA  Cardiovascular: S1, S2. RRR 80s  Abdomen: Soft, slightly grimaces to deep palpation of abdomen, nondistended.   +lang with yellow urine  Neurologi • folic acid  1 mg Intravenous Daily   • Levothyroxine Sodium  25 mcg Per NG Tube Before breakfast   • pantoprazole (PROTONIX) IV push  40 mg Intravenous Q12H   • umeclidinium-vilanterol  1 puff Inhalation Daily   • piperacillin-tazobactam  3.375 g Joseph Mercado 23. Subclinical hypothyroidism  1. levothyroxine per endo     Quality:  · DVT Prophylaxis: scds  · CODE status: DNR/DNI -palliative care spoke with spouse  · Harrington: yes reinserted  Estimated date of discharge: tbd     Plan of care discussed with RN.   PT/OT potassium chloride (KLOR-CON) powder packet 40 mEq     Date Action Dose Route User    6/18/2020 0825 Given 40 mEq Oral Latesha Holman RN      Thiamine HCl 500 mg in sodium chloride 0.9% 50 mL IVPB     Date Action Dose Route User    6/18/2020 0835 [place]

## 2020-06-18 NOTE — PLAN OF CARE
Assumed care of patient at 0730. Patient drowsy but became more alert throughout the day. Intermittently following commands in all extremities with equal strength. VSS, NSR on the monitor. Breath sounds diminished on RA.  Requiring less frequent suctionin neurological status  Description  INTERVENTIONS  - Assess for and report changes in neurological status  - Initiate measures to prevent increased intracranial pressure  - Maintain blood pressure and fluid volume within ordered parameters to optimize cerebr

## 2020-06-18 NOTE — PLAN OF CARE
Assumed pt care at [address]. Pt more alert than previous night, opening eyes to speech. Pt able to squeeze hands and move toes at this time. Pupils PERRL. Pt in NSR on tele, heart tones audible, and pulses palpable.  Pt has generalized edema, and pitting edema toileting schedule  Outcome: Progressing     Problem: RESPIRATORY - ADULT  Goal: Achieves optimal ventilation and oxygenation  Description  INTERVENTIONS:  - Assess for changes in respiratory status  - Assess for changes in mentation and behavior  - Positi electrolyte replacement as ordered  - Monitor response to electrolyte replacements, including rhythm and repeat lab results as appropriate  - Fluid restriction as ordered  - Instruct patient on fluid and nutrition restrictions as appropriate  Outcome: Prog Living  Goal: Achieve highest/safest level of independence in self care  Description  Interventions:  - Assess ability and encourage patient to participate in ADLs to maximize function  - Promote sitting position while performing ADLs such as feeding, groo

## 2020-06-19 NOTE — PLAN OF CARE
Problem: Impaired Swallowing  Goal: Minimize aspiration risk  Description  Recommend the following:  -NPO  -Frequent oral care/hygiene to prevent dry mouth and promote reflexive swallow response   Outcome: Progressing

## 2020-06-19 NOTE — PLAN OF CARE
Pt. Arletta Rail with periods of being awake. Able to follow simple commands, speech incomprehensible and soft. Tolerating tube feedings at goal rate. Bladder scanning as ordered. Incontinent of urine and loose stools.

## 2020-06-19 NOTE — PAYOR COMM NOTE
--------------  PLEASE FAX INPT DAYS AUTHORIZED ALONG WITH NRD [phone]    [facility] YOU    6/19 CONTINUED STAY REVIEW    Payor: [facility]  Subscriber #:  [id number]  Authorization Number: [id number]    REMAINS IN ICU    GLADYS Zhong 1457 06/18/20  2210 06/19/20  0527   WBC  --    < > 15.1* 17.5*  --  14.9*  --  8.6  --   --  6.9  --   --   --    HGB  --    < > 9.0*  8.9* 8.0*   < > 8.1*   < > 7.3*   < > 10.1* 9.8* 9.6* 9.5* 10.0*   MCV  --    < > 102.2* 102.5*  --  102.0*  --  101.3* 1. 2/2 aspiration PNA, Previous alcohol induced encephalopathy but well out of the range of withdrawal  2. EEG sharp/spikes initially thought to be sz however appears they were ruled out by neuro CC after further review on 6/15 and pt off keppra  3.  Given Enoxaparin Sodium (LOVENOX) 40 MG/0.4ML injection 40 mg     Date Action Dose Route User    6/19/2020 0835 Given 40 mg Subcutaneous (Left Lower Abdomen) Sloane Tafoya, RN      folic acid (FOLVITE) injection 1 mg     Date Action Dose Route User    6/19/2020

## 2020-06-19 NOTE — PROGRESS NOTES
1 [facility] [address] Follow Up     Ann Olivares  [id number]  Hospital Day #6  Date of Consult: 06/16/20  Patient seen at: [facility]     Subjective:      Patient was seen and examined without family  at the bedside.      Fr (ZOSYN) 3.375 g in dextrose 5 % 100 mL ADD-vantage, 3.375 g, Intravenous, Q8H  •  ondansetron HCl (ZOFRAN) injection 4 mg, 4 mg, Intravenous, Q6H PRN  No current outpatient medications on file.       Labs/ imaging     Hematology:  Lab Results   Component Va Palliative Performance Scale: 45%    Palliative Performance Scale   % Ambulation Activity Level Self-Care Intake Consciousness   100 Full Normal Full   Normal Full   90 Full No disease  Normal Full Normal Full   80 Full Some disease  Normal w/effort Fu [place] Levels [phone].          Healthcare Agent Appointed: Yes  Healthcare Agent's Name: surrogate Amadeo Housercelina Agent's Phone Number: [phone]        Disposition: ongoing goals of care discussions      Problem List       Toxic metabolic enc

## 2020-06-19 NOTE — SLP NOTE
ADULT SWALLOWING EVALUATION    ASSESSMENT    ASSESSMENT/OVERALL IMPRESSION:  Order received for BSE to r/o aspiration for potential PO diet level as Pt's mental status has improved for participation. Pt remains in CNICU and RN approved for SLP to proceed. hematuria    Other ascites    Seizure ([facility]    Shock ([facility] [address] [facility]    Subclinical status epilepticus ([facility] [address] [facility]    Acute encephalopathy    Palliative care encounter    Goals of care, counseling/discussion    Urinary retention      Past Medical History  Past Medical His Plan/Recommendations: Videofluoroscopic swallow study  Number of Visits to Meet Established Goals: 1  Follow Up Needed: Yes  SLP Follow-up Date: 06/20/20     PPE worn by SLP during this visit: surgical mask and gloves      Thank you for your referral.   If

## 2020-06-19 NOTE — PROGRESS NOTES
Pulmonary Progress Note        NAME: [address] [place] [address] Ne: 6099/8334-C - MRN: [id number] - Age: 59year old - : 1955        Last 24hrs: No events overnight, more awake and alert this morning    OBJECTIVE:   20  0500 20  0615 20 acidosis  - likely due to saline administration  -mildly improved today  - monitor.    3. PNA; presumed aspiration  -agree w/ zosyn, started the evening of 6/13, look to stop tomorrow (6/20) depending on clinical status  -repeat CXR with worsening R perihil

## 2020-06-19 NOTE — PROGRESS NOTES
[facility]  Progress Note     Kenneth Avina Patient Status:  Inpatient    1955 MRN [id number]   [facility] 3NE-A Attending Brian Massey MD   Hosp Day # 6 PCP Brenda Ramírez MD     Chief Complaint: hypoxia/AMD    S: Patie --   --   --   --   --   --   --   --     < > = values in this interval not displayed.        Recent Labs   Lab 06/15/20  1056 06/16/20  0534 06/17/20  0529 06/18/20  0525 06/19/20  0527   * 132* 132* 120*  --    BUN 8 8 6* 8  --    CREATSERUM 0.70 associated - POA  2. Previous culture with Candida and Hafnia  8. History of EtOH abuse  9. Mild transaminitis resolved  10. Recent UGI bleed   1. status post EGD with esophagitis/duodenitis  2. Stable hgb, given blood on 6/17, no blood in stool  3.  GI fol

## 2020-06-19 NOTE — PLAN OF CARE
Assumed care of patient at 0700. A/o x1-2 at times. Delayed responses BUT alert. Denies Chest pain, sob, Lightheadedness, dizziness. Total lift. Moved into chair this am with a max assist.   Following commands with delay.    NPO- NG got dislodged with Achieves optimal ventilation and oxygenation  Description  INTERVENTIONS:  - Assess for changes in respiratory status  - Assess for changes in mentation and behavior  - Position to facilitate oxygenation and minimize respiratory effort  - Oxygen supplement

## 2020-06-20 NOTE — PLAN OF CARE
Assumed care at 0730. Pt a/o to self, able to answer yes/no questions, VSS, on RA, NSR on telemetry. Pt with no c/o pain, n/v/d, SOB, dizziness/lightheadedness. Daily wt. Video swallow completed. SLP still recommending NPO at this time.  Dobhoff in place, p ADULT  Goal: Maintains adequate nutritional intake (undernourished)  Description  INTERVENTIONS:  - Monitor percentage of each meal consumed  - Identify factors contributing to decreased intake, treat as appropriate  - Assist with meals as needed  - Chris Ridley hyperglycemia and hypoglycemia  - Administer ordered medications to maintain glucose within target range  - Assess barriers to adequate nutritional intake and initiate nutrition consult as needed  - Instruct patient on self management of diabetes  Outcome: functionality and self care  Description  INTERVENTIONS  - Monitor swallowing and airway patency with patient fatigue and changes in neurological status  - Encourage and assist patient to increase activity and self care with guidance from PT/OT  - Encourag

## 2020-06-20 NOTE — PROGRESS NOTES
[facility]  Progress Note     Gaviota Bright Patient Status:  Inpatient    1955 MRN [id number]   [facility] 3NE-A Attending Liliana St MD   Hosp Day # 7 PCP Blaze Goldberg MD     Chief Complaint: Ryan Lesser: Patient o interval not displayed.        Recent Labs   Lab 06/15/20  1056 06/16/20  0534 06/17/20  0529 06/18/20  0525 06/19/20  0527 06/20/20  0351   * 132* 132* 120*  --  112*   BUN 8 8 6* 8  --  11   CREATSERUM 0.70 0.61 0.66 0.72  --  0.66   GFRAA 106 111 neg  7. Leukocytosis, resolved  8. UTI ruled out  9. History of EtOH abuse  10. Mild transaminitis resolved  11. Recent UGI bleed   1. status post EGD with esophagitis/duodenitis  2. Stable hgb, given blood on 6/17, no blood in stool  3.  GI following, PPI

## 2020-06-20 NOTE — PLAN OF CARE
Received pt at 1930. Pt alert and confused, oriented to person. Pt able to move all extremities and is following simple commands. She often talks incomprehensibly, but occasionally speaks clearly and is able to communicate needs.  Pt on RA, lungs sound rhon

## 2020-06-20 NOTE — PROGRESS NOTES
10 Jeannine Diehl Patient Status:  Inpatient    1955 MRN [id number]   [facility] 3NE-A Attending Dileep Pyle MD   Hosp Day # 7 PCP Alex Shah MD     SUBJECTIVE: Pt not verbalizing on exam today.       OBJECTI Exam:                          General: awake, not cooperative, in NAD                          HEENT: oropharynx clear without erythema or exudates, moist mucous membranes                          Lungs: scattered rhonchi                          Chest wa monitor  4. COPD  -anoro as mental status allows  -BD protocol  5. Proph  -LMWH  6.  Dispo- DNR/DNI  - palliative care following    Indiana Zee MD  6/20/2020  3:11 PM

## 2020-06-20 NOTE — VIDEO SWALLOW STUDY NOTE
ADULT VIDEOFLUOROSCOPIC SWALLOWING STUDY    Admission Date: 6/13/2020  Evaluation Date: 06/20/20  Radiologist: Dr. Hunt Ply: NPO  Diet Recommendations - Liquid: NPO    Further Follow-up:  Follow Up Needed Presentation: Teaspoon;Cup  Oral Phase of Swallow (VFSS - Thin Liquids): Impaired  Bolus Retrieval (VFSS - Thin Liquids): Intact  Bilabial Seal (VFSS - Thin Liquids): Intact  Bolus Formation (VFSS - Thin Liquids):  Impaired  Bolus Propulsion (VFSS - Thin [facility] Impaired  Triggered at: Pyriform sinuses  Premature Spillage to: Pyriform sinuses  Residue Severity, Location: Mild/Mod;Pyriform sinuses; Valleculae  Cleared/Reduced with: Attempted however ineffective; Secondary swallow  Laryngeal Penetration: after swallow before the swallow with laryngeal penetration and silent tracheal aspiration of nectar thick liquids, deep laryngeal penetration of honey thick and penetration of pureed with moderate residue following the swallow with all consistencies.  Multiple swallows

## 2020-06-20 NOTE — PLAN OF CARE
Received pt. At 0600  PT. A&Oxself- nonverbal mostly, some incomprensible speech noted  RA; VSS  Tele- NSR  Harrington in place  Incontinent for BM  Electrolyte protocol   Total lift required for transfers  Video swallow to be done today  NPO  NG tube.  Tube Fee

## 2020-06-21 NOTE — PLAN OF CARE
Pt. A&O x2, very hard to understand and extremely drowsy. Only able to answer yes/no questions. On RA, tele shows NSR. Dobhoff in place, tube feedings are Jevity 1.5 40 mL/hr with 130 cc water flushes Q4. Q6 accuchecks. NPO. Daily weight.  Harrington in place fo saturation or ABGs  - Provide Smoking Cessation handout, if applicable  - Encourage broncho-pulmonary hygiene including cough, deep breathe, Incentive Spirometry  - Assess the need for suctioning and perform as needed  - Assess and instruct to report SOB o intact  Description  INTERVENTIONS  - Assess and document risk factors for pressure ulcer development  - Assess and document skin integrity  - Monitor for areas of redness and/or skin breakdown  - Initiate interventions, skin care algorithm/standards of ca activity level and precautions during self-care     Outcome: Progressing     Problem: Delirium  Goal: Minimize duration of delirium  Description  Interventions:  - Encourage use of hearing aids, eye glasses  - Promote highest level of mobility daily  - Pro

## 2020-06-21 NOTE — PROGRESS NOTES
[facility]PITAL[facility]  Progress Note     Blanca Maria Patient Status:  Inpatient    1955 MRN [id number]   [facility] 3NE-A Attending Melinda Ruby MD   Hosp Day # 8 PCP Landon Navarro MD     Chief Complaint: very lethargic     S: Labs   Lab 06/15/20  1056 06/16/20  0534 06/17/20  0529 06/18/20  0525 06/19/20  0527 06/20/20  0351   * 132* 132* 120*  --  112*   BUN 8 8 6* 8  --  11   CREATSERUM 0.70 0.61 0.66 0.72  --  0.66   GFRAA 106 111 108 102  --  108   GFRNAA 92 96 94 89 resolved  11. Recent UGI bleed   1. status post EGD with esophagitis/duodenitis  2. Stable hgb, given blood on 6/17, no blood in stool  3. GI following, PPI BID  12. MITZY, possible acute blood loss anemia, partly dilution  1. S/p 1 unit, stable  13.  Thrombo

## 2020-06-21 NOTE — PROGRESS NOTES
10 Jeannine Diehl Patient Status:  Inpatient    1955 MRN [id number]   [facility] 3NE-A Attending Adonay Schroeder MD   Hosp Day # 8 PCP Gabby Maria MD     SUBJECTIVE:  No acute events overnight. Pt denies SOB. mucous membranes                          Lungs: scattered rhonchi                          Chest wall: No tenderness or deformity.                           ILGBR: XFGVSTE rate and rhythm, normal S1S2                          Abdomen: soft, non-tender, non

## 2020-06-21 NOTE — OCCUPATIONAL THERAPY NOTE
Received order for OT evaluation. Pt was seen by OT on 6/16 for evaluation. Spoke with RN. No change in mentation. Mobility team will continue to follow. OT will sign off.

## 2020-06-21 NOTE — PLAN OF CARE
Assumed care at 0730. Pt a/o to self, able to answer yes/no questions, very lethargic, drifts off to sleep when this RN speaks to her. VSS, on RA, NSR on telemetry. Pt with no c/o pain, n/v/d, SOB, dizziness/lightheadedness. Daily wt. NPO.  Dobhoff in plac supplementation based on oxygen saturation or ABGs  - Provide Smoking Cessation handout, if applicable  - Encourage broncho-pulmonary hygiene including cough, deep breathe, Incentive Spirometry  - Assess the need for suctioning and perform as needed  - Ass integrity remains intact  Description  INTERVENTIONS  - Assess and document risk factors for pressure ulcer development  - Assess and document skin integrity  - Monitor for areas of redness and/or skin breakdown  - Initiate interventions, skin care algorit tolerated functional activity level and precautions during self-care     Outcome: Progressing     Problem: Impaired Swallowing  Goal: Minimize aspiration risk  Description  Recommend the following:  -NPO  -Frequent oral care/hygiene to prevent dry mouth an

## 2020-06-22 NOTE — PLAN OF CARE
Problem: Impaired Swallowing  Goal: Minimize aspiration risk  Description  Recommend the following:  -NPO  -Frequent oral care/hygiene to prevent dry mouth and promote reflexive swallow response   -Dysphagia Therapy   Outcome: Not Progressing

## 2020-06-22 NOTE — DIETARY NOTE
[facility]    NUTRITION ASSESSMENT    Pt meets moderate malnutrition criteria.     CRITERIA FOR MALNUTRITION DIAGNOSIS:  Criteria for non-severe malnutrition diagnosis: acute illness/injury related to energy intake less than 75% for greater than 7 days mentation today per MD notes. Wt up since admit x 6kg; upper and lower extremity edema noted    64/F admitted with Confusion. PMH includes Etoh, HTN, COPD. Pt known to this service from previous admission. Pt obtunded, moaning.  Pt Npo due to lethargy and i prescription      MEDICATIONS:  FA, levothyroxine, Thiamine    LABS:  Noted     Pt is at moderate nutrition risk    FOLLOW-UP DATE: 6/26    Jackie Avila RD,LDN  Pager #[phone] [place] [phone]

## 2020-06-22 NOTE — PROGRESS NOTES
[facility]  Progress Note     Danielradha Graff Patient Status:  Inpatient    1955 MRN [id number]   [facility] 3NE-A Attending Fiona Bruce MD   Hosp Day # 9 PCP Kirsten Pugh MD     Chief Complaint: very fatigued    S: Pa 0. 66 0.74   GFRAA 111 108 102  --  108 99   GFRNAA 96 94 89  --  94 86   CA 8.3* 8.0* 8.4*  --  8.4*  --    ALB 1.4*  --  1.4*  --   --   --     144 143  --  142  --    K 3.8 3.6 3.8 4.6 4.5  --    * 119* 117*  --  113*  --    CO2 22.0 19.0* 20 esophagitis/duodenitis  2. Stable hgb, given blood on 6/17, no blood in stool  3. GI following, PPI BID  12. MITZY, possible acute blood loss anemia, partly dilution  1. S/p 1 unit, stable  13. Thrombocytopenia, improving to 80s  14.  Hypertension, currently

## 2020-06-22 NOTE — PLAN OF CARE
Resumed care of pt. At [address]. Pt. Is Ax1, rarely says yes or no but nods head.   DNR/DNI    Daily weight  Seizure precautions maintained    NPO  Lovenox  RA, Tele: NSR,   Juany Delgado--Jevity 1.5/40ml/hr with 130ml flush Q4 hours    Congested-whit Spirometry  - Assess the need for suctioning and perform as needed  - Assess and instruct to report SOB or any respiratory difficulty  - Respiratory Therapy support as indicated  - Manage/alleviate anxiety  - Monitor for signs/symptoms of CO2 retention  Alberta Mina and/or skin breakdown  - Initiate interventions, skin care algorithm/standards of care as needed  Outcome: Progressing     Problem: NEUROLOGICAL - ADULT  Goal: Achieves stable or improved neurological status  Description  INTERVENTIONS  - Assess for and re of hearing aids, eye glasses  - Promote highest level of mobility daily  - Provide frequent reorientation  - Promote wakefulness i.e. lights on, blinds open  - Promote sleep, encourage patient's normal rest cycle i.e. lights off, TV off, minimize noise and

## 2020-06-22 NOTE — PROGRESS NOTES
10 Jeannine Diehl Patient Status:  Inpatient    1955 MRN [id number]   [facility] 3NE-A Attending Meredith Logan MD   Hosp Day # 9 PCP Nelson Loaiza MD     SUBJECTIVE:  Pt with increased coarse cough.       OBJECTIV membranes                          Lungs: scattered coarse rhonchi                          Chest wall: No tenderness or deformity.                           DZIYZ: YXSKNRN rate and rhythm, normal S1S2                          Abdomen: soft, non-tender, non

## 2020-06-22 NOTE — CM/SW NOTE
Care Progression Note:  Active Acute Medical Issue:   Toxic metabolic encephalopathy     Other Contributing Medical Factors/Dx. [place] dysphagia, COPD, Etoh abuse, failure to thrive, Nicotine dependence.      Length of stay: 9  Avoidable Delays: NONE  Discharge B

## 2020-06-22 NOTE — PLAN OF CARE
Assumed patient care at 7:30   A/O x1 (drossy but can shake head yes and no)  Room air   NSR on tele   Harrington in place   Denies of pain at this time   Total lift when trying to ambulate   Seizure precautions are in place   Elctroylye protocol   Daily weight Progressing     Problem: RESPIRATORY - ADULT  Goal: Achieves optimal ventilation and oxygenation  Description  INTERVENTIONS:  - Assess for changes in respiratory status  - Assess for changes in mentation and behavior  - Position to facilitate oxygenation ordered  - Monitor response to electrolyte replacements, including rhythm and repeat lab results as appropriate  - Fluid restriction as ordered  - Instruct patient on fluid and nutrition restrictions as appropriate  Outcome: Progressing     Problem: SKIN/T highest/safest level of independence in self care  Description  Interventions:  - Assess ability and encourage patient to participate in ADLs to maximize function  - Promote sitting position while performing ADLs such as feeding, grooming, and bathing  - E

## 2020-06-22 NOTE — SLP NOTE
SPEECH DAILY NOTE - INPATIENT    ASSESSMENT & PLAN   ASSESSMENT  Pt seen for dysphagia therapy and to ensure following recommendations for NPO after VFSS completed on 6/20/2020. Pt awake and alert, demonstrating participation in therapy for 15 min.   Oral in progress        PLAN:   Dysphagia therapy targeting improved bolus control and oral and pharyngeal musculature exercises.      FOLLOW UP  Follow Up Needed: Yes  SLP Follow-up Date: 06/23/20  Number of Visits to Meet Established Goals: 2    Session: 2

## 2020-06-22 NOTE — PHYSICAL THERAPY NOTE
PHYSICAL THERAPY RE-EVALUATION - INPATIENT     Room Number: 6044/5707-C  Evaluation Date: 6/22/2020  Type of Evaluation: Re-evaluation  Physician Order: PT Eval and Treat    Presenting Problem: Septic hypovolumic shock;  Resp insufficiency; tma with mickey Performed by Sadie Rueda DO at [facility] ENDOSCOPY   • OTHER SURGICAL HISTORY  3/31/16    L arm shoulder repair   • OTHER SURGICAL HISTORY  21years old    jaw repair   • SHOULDER OPEN REDUCTION INTERNAL FIXATION Left 4/1/2016    Performed by Kimani Dudley noting that at rest arms will fully straighten into dependent position, and pt will hold arms up and brace with then when sitting EOB if placed in bracing position on the bed.      Lower extremity ROM is within functional limits     Lower extremity strength push backward, attend to environment/tasks. Rec'd pt in supine. PROM x 4 extremities x 8 minutes. Dependent t/f to EOB. Max assist to find upright balanced sitting posture.  Able to progress to min assist for EOB sitting balance, which pt tolerated Elderly Mobility Scale, indicating patient is dependent in terms of safe mobility. These impairments and comorbidities manifest themselves as functional limitations in independent bed mobility, transfers and gait.    The patient is below baseline and

## 2020-06-22 NOTE — PROGRESS NOTES
[id number] [address] Follow Up    Modesto Montoya Patient Status:  Inpatient    1955 MRN [id number]   [facility] 3NE-A Attending Genesis Yoo MD   [facility] Day # 9 PCP Gildardo Bhat MD     Date of visit:  2020  Day Asleep in bed, but wakes to voice. appears chronically ill. NAD. HEENT: Dry oral mucosa, tongue. TF per DHT. RESPIRATORY: No increased effort at rest on room air. Audible upper airway congestion. + Coarse BL anterior lung fields.   :  Urinary drainage b Initially Hernan Boudreaux stated that Lian Burns always mentioned she didn't want a whole bunch of tubes hanging out of her, had no idea if she would want a feeding tube in her. \"   Reviewed risks vs benefits of feeding tubes, and encouraged to consider in the trajectory of decline. Despite some improvements, nothing is significant enough to demonstrate improvement > decline. Hopes/Goals: Brief d/w spouse - he indicated he may want to consider nutrition via PEG \"so she doesn't starve. \" Dtr hopes for no suff Weakness generalized     Anemia, unspecified type     Abnormal CXR     Anemia     Macrocytic anemia     Iron deficiency anemia secondary to blood loss (chronic)     Steatohepatitis     Diarrhea     Pleural effusion     Severe alcohol use disorder ([facility] [address] Care Service will continue to follow.       YOANA Tay, [employer]  Palliative Care  [phone]    6/22/2020  3:41 PM    ADDENDUM: RN advised spouse was at bedside this evening with questions about gathering more information on feeding tube / nutritio

## 2020-06-22 NOTE — PAYOR COMM NOTE
--------------  CONTINUED STAY REVIEW    Payor: [facility] PPO  Subscriber #:  [id number]  Authorization Number: [id number]    Admit date: 6/13/20  Admit time: 2322    Admitting Physician: Diann Sloan MD  Attending Physician:  Monika Sánchez MD  Primary Car 06/20/20  0351 06/22/20  0606   * 132* 120*  --  112*  --    BUN 8 6* 8  --  11  --    CREATSERUM 0.61 0.66 0.72  --  0.66 0.74   GFRAA 111 108 102  --  108 99   GFRNAA 96 94 89  --  94 86   CA 8.3* 8.0* 8.4*  --  8.4*  --    ALB 1.4*  --  1.4*  -- resolved  8. UTI ruled out  9. History of EtOH abuse  10. Mild transaminitis resolved  11. Recent UGI bleed   1. status post EGD with esophagitis/duodenitis  2. Stable hgb, given blood on 6/17, no blood in stool  3. GI following, PPI BID  12.  MITZY, possible rhythm. No murmurs, rubs or gallops. Abdomen: Soft, nontender, nondistended. Positive bowel sounds. No rebound or guarding. Neurologic: No focal neurological deficits. Musculoskeletal: Moves all extremities.  Opens eyes and wiggles toes today  Extremi Levothyroxine Sodium  25 mcg Per NG Tube Before breakfast   • pantoprazole (PROTONIX) IV push  40 mg Intravenous Q12H   • umeclidinium-vilanterol  1 puff Inhalation Daily         ASSESSMENT / PLAN:      24.  Acute metabolic encephalopathy - waxes and wanes Mindi Rachel ) 95.8 °F (35.4 °C) (Axillary)   Resp 16   Ht 5' 1\" (1.549 m)   Wt 143 lb 1.6 oz (64.9 kg)   SpO2 96%   BMI 27.04 kg/m²   O2 requirement: RA     I/O last 3 completed shifts:   In: 5376.8 [I.V.:507; Blood:343.8; NG/GT:3726; IV PIGGYBACK:800]  Out: 7373 [U murmur.                          Abdomen: soft, non-tender, non-distended, positive BS.                         Extremity: No clubbing or cyanosis.  no edema                          Skin: No rashes or lesions.              Lab Results   Component Value Da 6/22/2020 0535 Given 650 mg Per NG Tube Nathan Ross RN    6/21/2020 1724 Given 650 mg Per NG Tube Amber Goodson, RN      Enoxaparin Sodium (LOVENOX) 40 MG/0.4ML injection 40 mg     Date Action Dose Route User    6/22/2020 0830 Given 40 mg Subcu

## 2020-06-23 NOTE — SLP NOTE
Family in process of making decision re: PEG vs comfort care. Care conference scheduled for today. Will await results and proceed accordingly.     Fadi Nuñez MA, [address] [facility]  Pager

## 2020-06-23 NOTE — PLAN OF CARE
NPO  Patient is Alert x1 (responds non verbally to name only)  On room air  NS/ST on tele  Harrington in tact  BM today  Unable to assess pain  Total Lift  Lovenox SubQ  SCD on  Seizure precautions  Daily weight  Electrolyte protocol  Q6 accucheck  1.5 Jevity 4 ADULT  Goal: Achieves optimal ventilation and oxygenation  Description  INTERVENTIONS:  - Assess for changes in respiratory status  - Assess for changes in mentation and behavior  - Position to facilitate oxygenation and minimize respiratory effort  - Oxyg electrolyte replacements, including rhythm and repeat lab results as appropriate  - Fluid restriction as ordered  - Instruct patient on fluid and nutrition restrictions as appropriate  Outcome: Progressing     Problem: SKIN/TISSUE INTEGRITY - ADULT  Goal: self care  Description  Interventions:  - Assess ability and encourage patient to participate in ADLs to maximize function  - Promote sitting position while performing ADLs such as feeding, grooming, and bathing  - Educate and encourage patient/family in t

## 2020-06-23 NOTE — CONSULTS
[facility]  Report of Inpatient Wound Care Consultation     Jamil Hsieh Patient Status:  Inpatient    1955 MRN [id number]   [facility] 3NE-A Attending Meredith Logan MD   Hosp Day # 10 PCP MD Daron Simmons 373 --   --   --    HCT 29.6*  --   --  28.1*  --   --   --   --  27.2*  --   --   --    PLT 89.0*  --  80.0* 63.0*  --   --  86.0*  --  105.0*  --   --   --    K 3.8  --  4.6  --  4.5  --   --   --  3.9  --   --   --    CREATSERUM 0.72  --   --   --  0.66  -- [phone]  Spectralink: [phone]  Pager: [phone]  VM: [phone]

## 2020-06-23 NOTE — PROGRESS NOTES
[address] [facility] [address] Follow Up     Raina Rachel  [id number]  Hospital Day #10  Date of Consult: 06/16/20  Patient seen at: [facility]     Subjective:      Patient was seen and examined without family at the bedside.      Fr 06/22/2020     06/22/2020    K 3.9 06/22/2020     06/22/2020    CO2 27.0 06/22/2020     (H) 06/22/2020    CA 8.3 (L) 06/22/2020    ALB 1.3 (L) 06/22/2020    ALKPHO 159 (H) 06/22/2020    BILT 0.3 06/22/2020    TP 4.9 (L) 06/22/2020    AST Drowsy/confused   10 Bedbound/coma Extensive Disease  Can’t do any work  coma  Max Assist  Total Care Mouth care Drowsy or coma   0 Death     Palliative Care Assessment     Goals of Care:   I attempted to reach Mr. Randal Saavedra and there was no answer.  I called T Urinary retention    Dysphagia    Thrombocytopenia (HCC)      Palliative Care Recommendations/Plan:     1. Ongoing goals of care: The family is planning on having an informational meeting with hospice.  Once they meet with hospice they will further discuss

## 2020-06-23 NOTE — CM/SW NOTE
CM reached out to Suly Botello () and Cayetano Pichardo (daughter) to discuss plan of care. Message left on husbands voicemail. CM spoke with daughter and informed they would like an \"informative only\" meeting with [facility].  Per daughter,

## 2020-06-23 NOTE — PAYOR COMM NOTE
--------------  CONTINUED STAY REVIEW    Payor: [facility] PPO  Subscriber #:  [id number]  Authorization Number: [id number]    Admit date: 6/13/20  Admit time: 2322    Admitting Physician: Melida Salas MD  Attending Physician:  Kalyan Reyes MD  Primary Car NAD                          HEENT: oropharynx clear without erythema or exudates, moist mucous membranes                          Lungs: scattered coarse rhonchi                          Chest wall: No tenderness or deformity.                           CIK following, family considering hospice     Beata Alfaro MD                  Electronically signed by Kodak D eLeon MD at 6/23/2020 12:07 PM           MEDICATIONS ADMINISTERED IN LAST 1 DAY:  Enoxaparin Sodium (LOVENOX) 40 MG/0.4ML injection of EtOH abuse  11. Mild transaminitis resolved  12. Recent UGI bleed   1. status post EGD with esophagitis/duodenitis  2. Stable hgb, given blood on 6/17, no blood in stool  3. GI following, PPI BID  13.  MITZY, possible acute blood loss anemia, partly diluti

## 2020-06-23 NOTE — HOSPICE RN NOTE
Spoke with pt  and daughter regarding hospice. They want to follow up tomorrow morning regarding plan.

## 2020-06-23 NOTE — PROGRESS NOTES
Daughter has returned my call this afternoon and we had a great and insightful conversation about her mother. Per Sandra Lawson, her mother has been an alcoholic all her life and has \"gone through a lot\".  She had always expressed to her family that she guardado

## 2020-06-23 NOTE — HOSPICE RN NOTE
Referral received. Call placed to daughter Rebecca Gottlieb. She is at work and is trying to figure out a time that she and her father could meet to hear info about hospice. She is checking with him and will let us know a time.   Add:  Hospice will meet with Janett Cano

## 2020-06-23 NOTE — PLAN OF CARE
Assumed pt care @ 0730. Pt in bed, opens eyes when called name, not following commands, not moving extremities, non-verbal, moaning at times. ST on the monitor, 's. On RA, Ow sat 92%. Jevity 1.5 TF via dobhoff.    Harrington draining clear yellow uri including cough, deep breathe, Incentive Spirometry  - Assess the need for suctioning and perform as needed  - Assess and instruct to report SOB or any respiratory difficulty  - Respiratory Therapy support as indicated  - Manage/alleviate anxiety  - Monito skin integrity  - Monitor for areas of redness and/or skin breakdown  - Initiate interventions, skin care algorithm/standards of care as needed  Outcome: Progressing     Problem: NEUROLOGICAL - ADULT  Goal: Achieves stable or improved neurological status Minimize aspiration risk  Description  Recommend the following:  -NPO  -Frequent oral care/hygiene to prevent dry mouth and promote reflexive swallow response   -Dysphagia Therapy   Outcome: Not Progressing     Problem: Delirium  Goal: Minimize duration of

## 2020-06-23 NOTE — CM/SW NOTE
JAUN spoke with Dr Perry Castillo via secure messenger to discuss plan of care. Per Dr Perry Castillo, family is indecisive at this time regarding dc plan.  [facility] meeting planned for today at 5 pm. Per MD, family is frustrated with SW/CM reaching out to discuss

## 2020-06-23 NOTE — PROGRESS NOTES
[facility] HOSPITALIST  Progress Note     Lynn Rodriguez Patient Status:  Inpatient    1955 MRN [id number]   [facility] 3NE-A Attending Eliane Estrella MD   Hosp Day # 8 PCP Serenity Joyce MD     Chief Complaint: very fatigued    S: P 142  --  139   K 3.8 4.6 4.5  --  3.9   *  --  113*  --  107   CO2 20.0*  --  24.0  --  27.0   ALKPHO 183*  --   --   --  159*   AST 21  --   --   --  20   ALT 22  --   --   --  13   BILT 0.5  --   --   --  0.3   TP 4.6*  --   --   --  4.9*       Es improved  15. Hypertension, currently low  16. NSVT  17. Recent pleural effusion s/p Thora 5/28  18. Chronic bronchiectasis, COPD, stable  19. Moderate protein calorie malnutrition  1. TF   20. H/o Nicotine dependence  21. FTT  22. Steatohepatitis  23.  Onel Mccarthy

## 2020-06-23 NOTE — PROGRESS NOTES
10 Jeannine Diehl Patient Status:  Inpatient    1955 MRN [id number]   [facility] 3NE-A Attending Maria Ines Correa MD   Hosp Day # 10 PCP Marisa Perez MD     SUBJECTIVE:  Pt remains lethargic, not interactive with m membranes                          Lungs: scattered coarse rhonchi                          Chest wall: No tenderness or deformity.                           UXSFD: FCCNFFB rate and rhythm, normal S1S2                          Abdomen: soft, non-tender, non

## 2020-06-24 PROBLEM — G93.40 ENCEPHALOPATHY: Status: ACTIVE | Noted: 2020-01-01

## 2020-06-24 NOTE — PLAN OF CARE
Patient alert to baseline, opens eyes and looks at RN when name said. No verbal responses.  at bedside in AM.  Patient placed on hospice this am, documentation signed. Morphine drip started, primed 3.1, witnessed and dual sign off completed.     12

## 2020-06-24 NOTE — PROGRESS NOTES
[facility] HOSPITALIST  Progress Note     Blanca Maria Patient Status:  Inpatient    1955 MRN [id number]   [facility] 3NE-A Attending Dr. John Sullivan Day # 0 PCP Landon Navarro MD     Chief Complaint[de-identified] unable to obtain 2/2 lethargy 0.5  --   --   --  0.3   TP 4.6*  --   --   --  4.9*       CrCl cannot be calculated (Unknown ideal weight.). No results for input(s): PTP, INR in the last 168 hours. No results for input(s): TROP, CK in the last 168 hours.          Imaging: Imaging d

## 2020-06-24 NOTE — PROGRESS NOTES
[facility] HOSPITALIST  Progress Note     Karthikeyan Hernández Patient Status:  Inpatient    1955 MRN [id number]   [facility] 3NE-A Attending Dr. Lawrence Ferrer Day # 6 PCP Anabella Sommers MD     Chief Complaint[de-identified] unable to obtain 2/2 letharg 0.5  --   --   --  0.3   TP 4.6*  --   --   --  4.9*       Estimated Creatinine Clearance: 70.3 mL/min (based on SCr of 0.61 mg/dL). No results for input(s): PTP, INR in the last 168 hours. No results for input(s): TROP, CK in the last 168 hours.

## 2020-06-24 NOTE — PROGRESS NOTES
Per hospice RN, pt's  would like to stop TF immediately tonight, he decided hospice, hospice will meet with him tmr again to sign papers. Dr. Hiram Horvath made aware of 's request. Per Dr. Hiram Horvath, ok to remove ngt.    Endorsed oncoming RNs to stop T

## 2020-06-24 NOTE — PLAN OF CARE
NPO  Patient opens eyes to name but doesn't track  On 1L O2, was de-satting to 87  ST (102-110) on tele  Febrile-temp 100.6 axillary, notified MD-tylenol suppository ordered  Juany in tact  BM today  Unable to assess pain  Total Lift  Lovenox SubQ  SCD on appropriate  - Consider OT/PT consult to assist with strengthening/mobility  - Encourage toileting schedule  Outcome: Progressing     Problem: RESPIRATORY - ADULT  Goal: Achieves optimal ventilation and oxygenation  Description  INTERVENTIONS:  - Assess fo rhythm and assess patient for signs and symptoms of electrolyte imbalances  - Administer electrolyte replacement as ordered  - Monitor response to electrolyte replacements, including rhythm and repeat lab results as appropriate  - Fluid restriction as orde lift for transfers    Outcome: Progressing     Problem: Impaired Activities of Daily Living  Goal: Achieve highest/safest level of independence in self care  Description  Interventions:  - Assess ability and encourage patient to participate in ADLs to maxi

## 2020-06-24 NOTE — HOSPICE RN NOTE
signed hospice consents. Discussed POC with Dr. Tyler Davison and Dr. Ashu Ann for GIP hospice. Called admitting and flipped pt chart to hospice. Updated hospital RN Michelle Dasilva on POC. Pt is GIP appropriate for management of dyspnea.

## 2020-06-24 NOTE — PLAN OF CARE
Patient at baseline and resting in bed. Patient changed to inpatient hospice per [facility] RN-verbal from Dr. Tricia Aguilar.     Problem: SAFETY ADULT - FALL  Goal: Free from fall injury  Description  INTERVENTIONS:  - Assess pt frequently for physical

## 2020-06-24 NOTE — PALLIATIVE CARE NOTE
I called Emmie Runner to follow up on his wishes for [place] with hospice. He is meeting Hospice at [phone] to complete the final process. I informed him his daughter and son will allowed to visit under hospice care.  He felt relief she will be able to stay in the hos

## 2020-06-24 NOTE — CM/SW NOTE
Sw met with pts  to finalize decision for hospice. Competed paperwork. Pt appropriate for inpatient hospice due to breathing and congestion. SW will continue to follow up for support to family. Argenis Traore

## 2020-06-24 NOTE — DISCHARGE SUMMARY
[facility] [place] HOSPITALIST  DISCHARGE SUMMARY     Daniel Graff Patient Status:  Inpatient    1955 MRN [id number]   [facility] 3NE-A Attending Steve Arteaga MD   [facility] Day # 6 PCP Kirsten Pugh MD     Date of Admission: 2020  Date ED for further evaluation. Patient is obtunded. Eyes do open and responsive speech but only moans. Brief Synopsis: Patient is 60 yo female with ongoing AMS/FTT, recently discharged after extensive w/u for encephalopathy.  She was treated for UTI/GIB al

## 2020-06-24 NOTE — PAYOR COMM NOTE
--------------  CONTINUED STAY REVIEW    Payor: JASSI PPO  Subscriber #:  [id number]  Authorization Number: [id number]    Admit date: 6/13/20  Admit time: 200    Patient transitioning to hospice    Admitting Physician: Bipin Li MD  Attending Physic

## 2020-06-25 NOTE — PROGRESS NOTES
06/24/20 1938   Clinical Encounter Type   Visited With Patient and family together  (Brother and neice at bedside)   Routine Visit Introduction   Continue Visiting No   Patient's Supportive Strategies/Resources  provided emotional support for th

## 2020-06-25 NOTE — CM/SW NOTE
SW met with pt and spouse and son in room. Pt was opening eyes to her name. Sw spoke with family about pts status of inpatient and how the reasons for gip are stabilizing.  Family stated that they were not informed of possibility of having to discharge if

## 2020-06-25 NOTE — PROGRESS NOTES
Y[facility]I HOSPITALIST  Progress Note     [facility] Patient Status:  Inpatient    1955 MRN [id number]   [facility] 3NE-A Attending Dr. Elena Das Day # 1 PCP Jonathan Hickey MD     Chief Complaint: unable to obtain 2/2 ams    S: hours.         Imaging: Imaging data reviewed in Epic. Medications:   • scopolamine  1 patch Transdermal Q72H       ASSESSMENT / PLAN:     1.  Acute metabolic encephalopathy, worse today  1. 2/2 aspiration PNA, Previous alcohol induced encephalopathy but

## 2020-06-25 NOTE — HOSPICE RN NOTE
GIP day 2     60 y/o Dx copd admitted to hospital 6/13  admitted to hospice care 6/24 for Gip level of care  for dyspna : difficulty and  and coughing    Spoke with Vicki, reports no issues now or over the night.  Pt easily arouse, opens eyes , blank stare

## 2020-06-25 NOTE — PROGRESS NOTES
On hospice  Patient nonverbal  Son visited last night  On morphine gtt at 1mg/hr  Will continue to monitor

## 2020-06-25 NOTE — PAYOR COMM NOTE
--------------  DISCHARGE REVIEW    Payor: JASSI PEREZ  Subscriber #:  [id number]  Authorization Number: [id number]    Admit date: 6/13/20  Admit time:  2322  Discharge Date: 6/24/2020 11:11 AM     Admitting Physician: Eneida Rodriugez MD  Attending Physician: Rupert Tierney is a 59year old female with past medical history of COPD, hypertension, alcohol abuse who presents with altered mental status. At baseline patient is conversive, mild memory loss.   Patient was just discharged 3 days ago for similar altered mental s and high susceptibility for subclinical seizures given above clinical history.     6/15/2020  INTERPRETATION:  This is a abnormal  2 to 12-hour video EEG Due to presence of generalized slowing with occasional generalized discharges with triphasic morphology

## 2020-06-25 NOTE — H&P
[facility]I HOSPITALIST  History and Physical     Bruce Graham Patient Status:  Inpatient    1955 MRN [id number]   [facility] 3NE-A Attending Monty Acharya MD   Hosp Day # 1 PCP Wesley Osler, MD     Chief Complaint: converted to leg)   Pulse 72   Temp 97.4 °F (36.3 °C) (Axillary)   Resp 22   SpO2 98%   General: unresponsive. Respiratory: Good inspiratory effort. diminished to auscultation bilaterally. No rhonchi. Cardiovascular: S1, S2. Regular rhythm. Regular rate.   Abdomen: resolved  15. Steatohepatitis  16. Recent UGI bleed status post EGD with esophagitis/duodenitis  17. MITZY, possible acute blood loss anemia, partly dilution S/p 1 unit  18. Thrombocytopenia, improved  19. Hypertension  20.  NSVT  21. H/o Nicotine dependence

## 2020-06-25 NOTE — PLAN OF CARE
On hospice  Morphine drip at 1mg/hr   Unresponsive to stimuli this am but this afternoon responsive to voice, able to nod head, say a few words, follow few commands intermittently.    Pt comfortable throughout shift   All needs met   Will continue to [facility]

## 2020-06-26 NOTE — HOSPICE RN NOTE
Collaboated with hospice pharmacist    Morphine 1mg /hr continuous converts to   Roxanol (20mg/1ml)  10 mg(0.5 ml)  PO/SL every 4 hours RTC  Roxanol 10 mg (0.5 ml) PO/SL every 2 hours as needed for break hrough pain

## 2020-06-26 NOTE — PROGRESS NOTES
Y[facility]I HOSPITALIST  Progress Note     [facility] Patient Status:  Inpatient    1955 MRN [id number]   [facility] 3NE-A Attending Dr. Diana Torres Day # 2 PCP Sara Jackson MD     Chief Complaint: unable to obtain 2/2 ams    S: ASSESSMENT / PLAN:     1. Acute metabolic encephalopathy, worse today  1. 2/2 aspiration PNA, Previous alcohol induced encephalopathy but well out of the range of withdrawal  2. Dysphagia   3. Septic/hypovolemic shock, resolved  4.  Hypoxia - resolved

## 2020-06-26 NOTE — HOSPICE RN NOTE
GIP Day 3. Patient resting in bed with hob elevated. Appears comfortable. Receiving morphine drip at 1mg/hr. No respiratory distress observed. Respirations are even and unlabored. Patient opens her eyes to verbal stimuli and then closes them.  Generalized e

## 2020-06-26 NOTE — CM/SW NOTE
Care Progression Note:  Active Acute Medical Issue:   Encephalopathy     Other Contributing Medical Factors/Dx.: HTN,  Dysphagia     Length of stay: 2  Avoidable Delays:   Discharge Barriers: None  Expected discharge date:  Today   Expected discharge level

## 2020-06-26 NOTE — CM/SW NOTE
SW met with pt, spouse and daughter to [employer] of transfer home, education on home hospice services, meds, equipment.   Equipment not able to be delivered tonight due to family needing son in law to move furniture and will be delivered early tomorr

## 2020-06-26 NOTE — PLAN OF CARE
I d/w dtr/spouse at [facility] to discharge home with hospice and I spoke with family about this yesterday also to prepare them. Pt remains comfortable on current oral mgmt started and opens eyes when family arrived.   Melida hospice RN updated also on d/

## 2020-06-26 NOTE — CM/SW NOTE
SW met with pt. Opened eyes, non verbal.  planning to be here around 1pm. Will follow up when present.

## 2020-06-26 NOTE — PROGRESS NOTES
On inpt hospice  Patient opens eyes when name being called but would go back to sleep immediately after  Unable to assess pain;  Patient seemed comfortable   On  Morphine gtt   Daughter visited early in the shift  Generalized edema noted - GERI > MARCIO Harrington

## 2020-06-26 NOTE — PROGRESS NOTES
Patient in patient hospice. Patient resting in bed w/call light w/in reach. No signs of pain or difficulty breathing. IV morphine running per orders. 2L O2 via NC Patient opens eyes when name said but immediatly closes eyes. No verbal response.   N/O recei

## 2020-06-26 NOTE — CM/SW NOTE
JUAN has spoken with Karthikeyan from [facility] regarding updates. Patient will discharge tomorrow 6/27. This will allow for the appropriate equipment to arrive and family to be ready.

## 2020-06-27 NOTE — PLAN OF CARE
Patient very drowsy, not easily arousable but will wake up for very short periods. Did not speak. Did take her medication.

## 2020-06-27 NOTE — PROGRESS NOTES
[facility] HOSPITALIST  Progress Note     Rosangela Hardy Patient Status:  Inpatient    1955 MRN [id number]   [facility] 3NE-A Attending Dr. Kae Malin Day # 3 PCP Sona Martin MD     Chief Complaint: unable to obtain 2/2 ams    S: the range of withdrawal  2. Dysphagia   3. Septic/hypovolemic shock, resolved  4. Hypoxia - resolved  5. Peripheral edema  6. Aspiration/gram neg PNA, mucus plugging  7. Diarrhea, improving  8. Leukocytosis, resolved  9. UTI ruled out  10.  History of EtOH

## 2020-06-27 NOTE — HOSPICE RN NOTE
Opens eyes independently. No tracking only opens right eye. Nonverbal. Pain ad 0 PPS 10. Morphine 10mg q 4 hours SL for dyspnea with relief. Respirations 17. Oxygen 2liters/NC. Lung sounds congested throuhout. Staff RN to give meds for congestion.  SW call

## 2020-06-27 NOTE — PLAN OF CARE
NURSING DISCHARGE NOTE    Discharged Home via Ambulance. Accompanied by Support staff  Belongings Taken by patient/family. Paperwork sent with ambulance staff. [facility] paperwork sent as well.  called and updated on dc.

## 2020-06-27 NOTE — PLAN OF CARE
Patient opens eyes occasionally. Nonverbal.  Scheduled morphine. Scop patch behind R ear. Oral care. Repositioned. Harrington draining. Plan for dc at 3pm.  Resting comfortably in bed. Will continue to monitor.

## 2020-06-28 NOTE — DISCHARGE SUMMARY
[facility] HOSPITALIST  DISCHARGE SUMMARY     Juana Voss Patient Status:  Inpatient    1955 MRN [id number]   [facility] 3NE-A Attending No att. providers found   Hosp Day # 3 PCP Dorita Shore MD     Date of Admission: 2020

## 2021-01-27 DIAGNOSIS — Z23 NEED FOR VACCINATION: ICD-10-CM

## 2022-01-19 ENCOUNTER — TELEPHONE (OUTPATIENT)
Dept: INTERNAL MEDICINE CLINIC | Facility: CLINIC | Age: 67
End: 2022-01-19

## 2023-06-10 NOTE — PROGRESS NOTES
Called [facility] at this time and spoke to Rachel, she will be paging out for consult    Pulmonary Progress Note        NAME: [address] [place] [address] Ne: 9509/2040-B - MRN: [id number] - Age: 59year old - : 1955        Last 24hrs: No events overnight, more awake today but still not following commands    OBJECTIVE:   20  0430  precautions with HOB elevated and strict NPO  3. Non-anion gap metabolic acidosis  - likely due to saline administration  -mildly improved today  - monitor.    4. PNA; presumed aspiration  -agree w/ zosyn, started the evening of 6/13  -repeat CXR with worse

## 2024-08-15 NOTE — PROGRESS NOTES
[facility]  Neuro Critical Care Progress Note    Ann Olivares Patient Status:  Inpatient    1955 MRN [id number]   [facility] 6NE-A Attending Dawit Barber MD   Hosp Day # 4 PCP Akanksha Mcginnis MD     Subjective:  Leslie Montes WART TREATMENT    Warts can go away without treatment. About 60% of warts will disappears within 2 years.   Warts can be very hard to treat. Sometimes several different treatments are needed to get the warts to go away. There is no single perfect treatment, and successful treatment can take many months. For in office treatments, multiple visits are usually required.  Below are some of the more common treatments.   LIQUID NITROGEN  A cold spray is used to freeze the cells infected with the virus. It may cause a blister.    HOME TREATMENT  Over the counter wart treatment containing salicylic acid can work as well as in office treatment, but needs to be used regularly for 2 to 4 months.   If a blister forms after in office treatment, wait for it to heal before starting home treatment.  Apply salicylic acid to the warts every night avoiding the surrounding normal skin. After the medicine dries, cover the wart with duct tape and leave on overnight or 24 hours.    A couple times a week, soak the wart in warm water and gently rub off the dead white skin with a nail file or pumice stone.  If the skin becomes too irritated (red and scabby), use the treatment every other day.  Do not use this medicine on the face or groin area unless instructed to do so by your doctor.  Continue home treatment for a few weeks after the wart is no longer visible.  PRESCRIPTION CREAMS  Wartpeel works very well for warts and usually gets them to go away within 2 months of regular use. It can be irritating and is not covered by insurance. It should not be used on the face or groin area.   Retin-A (tretinoin, Differin) and Aldara (imiquimod) are sometimes used to treat flat warts or warts on the face. They are applied once a day to the warts for 2-4 months and can be irritating.   PLEASE NOTE:  Take acetaminophen (Tylenol) for any discomfort after treatment.  The goal of liquid nitrogen and cantharidin treatment is to form a blister.  The blister  354816 UNIT/GM cream 1 Application, 1 Application, Topical, PRN  •  umeclidinium-vilanterol (ANORO ELLIPTA) 62.5-25 MCG/INH inhaler 1 puff, 1 puff, Inhalation, Daily  •  Piperacillin Sod-Tazobactam So (ZOSYN) 3.375 g in dextrose 5 % 100 mL ADD-vantage, 3.3 may fill with blood.  If the blister is very large and painful, let the doctor know at the next visit.  If the blister is not painful, let it heal over on its own.  If the blister is painful, you can prick it with a clean needle to let the fluid drain.  Do not remove the top of the blister.    Other side effects of liquid nitrogen and cantharidin are a light or dark spot, ring wart formation or nail deformity if treating a wart by the nail.     breakdown. Endocrine:  -Hyperglycemia protocol. No further recommendations from my perspective. Call with questions.      Shannon Dobbs MD  Neurocritical care  [facility]  Pager: [phone]  06/17/20 10:32 AM

## (undated) DEVICE — FORCEP BIOPSY RJ4 LG CAP W/ND

## (undated) DEVICE — 3M™ RED DOT™ MONITORING ELECTRODE WITH FOAM TAPE AND STICKY GEL, 50/BAG, 20/CASE, 72/PLT 2570: Brand: RED DOT™

## (undated) DEVICE — 1200CC GUARDIAN II: Brand: GUARDIAN

## (undated) DEVICE — Device: Brand: DEFENDO AIR/WATER/SUCTION AND BIOPSY VALVE

## (undated) DEVICE — MEDI-VAC NON-CONDUCTIVE SUCTION TUBING: Brand: CARDINAL HEALTH

## (undated) DEVICE — MEDI-VAC SUCTION HANDLE REGULAR CAPACITY: Brand: CARDINAL HEALTH

## (undated) DEVICE — MASK ETCO2 PANORAMIC

## (undated) DEVICE — ENDOSCOPY PACK UPPER: Brand: MEDLINE INDUSTRIES, INC.

## (undated) NOTE — LETTER
10/05/18        Yue Hidalgo  [address] [address]      Dear Logan Post,    Our records indicate that you have outstanding lab work and or testing that was ordered for you and has not yet been completed:  Orders Placed This Encounter

## (undated) NOTE — IP AVS SNAPSHOT
Patient Demographics     Address  [address] [place] [address] Phone  [phone] [facility])  [phone] (Mobile) *Preferred* E-mail Address  [email or web address]      Emergency Contact(s)     Name Relation Home Work Mobile    Nir Salazar Microbiology Results (All)     None         H&P - H&P Note      H&P signed by Chrystal Oppenheim, MD at 6/25/2020  7:27 AM  Version 1 of 1    Author:  Chrystal Oppenheim, MD Service:  Hospitalist Author Type:  Physician    Filed:  6/25/2020  7:27 AM Date of Servic • Cancer Mother 61        esophogeal    • Cancer Paternal Grandmother 48        colon   • Colon Cancer Paternal Grandmother      Allergies: No Known Allergies  Medications:  No current facility-administered medications on file prior to encounter.    No curr withdrawal  4. Dysphagia/aspiration risk 2/2 AMS/FTT   5. Septic/hypovolemic shock, resolved  6. Hypoxia - resolved  7. Chronic bronchiectasis, COPD  8. Recent pleural effusion s/p Thora 5/28  9. Moderate protein calorie malnutrition  10.  Peripheral edema

## (undated) NOTE — LETTER
[facility] [address], [address] [place] [address]    Consent for Anesthesia   1.    Linda Murray agree to be cared for by a physician anesthesiologist alone and/or with a nurse anesthetist, who is specially trained to monitor me and give me allergic reactions to medications, injury to my airway, heart, lungs, vision, nerves, or muscles and in extremely rare instances death. 5. My doctor has explained to me other choices available to me for my care (alternatives).   6. Pregnant Patients (“epid Printed: 5/27/2020 at 3:36 PM    Medical Record #: [id number]                                            Page 1 of 1

## (undated) NOTE — IP AVS SNAPSHOT
[address]            (For Outpatient Use Only) Initial Admit Date: 5/26/2020   Inpt/Obs Admit Date: Inpt: 5/26/20 / Obs: N/A   Discharge Date:    Link Pattee:  [de-identified]   MRN: [de-identified]   CSN: [id number]   CEID: [id number] Hospital [place] [facility]    [place] 10, 2020

## (undated) NOTE — LETTER
[facility] FOR BLOOD OR BLOOD COMPONENTS      In the course of your treatment, it may become necessary to administer a transfusion of blood or blood components.  This form provides basic information concerning this proc explain the alternatives to you if it has not already been done. I,Nicole Salazar, have read/had read to me the above. I understand the matters bearing on the decision whether or not to authorize a transfusion of blood or blood components.  I have no que

## (undated) NOTE — LETTER
Tootie Gonzalez [address]  [place], [address] [facility]    Consent for Operation  Date: __________________                                Time: _______________    1.  I authorize the performance upon Magaly Lora the following operation:  Dayan Rosenbaum videotape. [facility] [place] will not be responsible for storage or maintenance of this tape. 6. For the purpose of advancing medical education, I consent to the admittance of observers to the Operating Room.   7. I authorize the use of any specimen, organs, ti When the patient is a minor or mentally incompetent to give consent:  Signature of person authorized to consent for patient: ___________________________   Relationship to patient: ____________________________________________________    Signature of Witness these medicines may increase my risk of anesthetic complications. iv. If I am allergic to anything or have had a reaction to anesthesia before. 3. I understand how the anesthesia medicine will help me (benefits).   4. I understand that with any type of an patient’s representative) and answered their questions. The patient or their representative has agreed to have anesthesia services.     _____________________________________________________________________________  Witness        Date   Time  I have merly

## (undated) NOTE — IP AVS SNAPSHOT
Patient Demographics     Address  [address] [place] [id number] Phone  [phone] [facility])  [phone] (Mobile) *Preferred* E-mail Address  [email or web address]      Emergency Contact(s)     Name Relation Home Work Mobile    Nir Salazar nystatin [id number] UNIT/GM Crea  Commonly known as:  MYCOSTATIN      Apply 1 Application topically as needed for Dry Skin.   Stop taking on:  June 20, 2020   Mark Mary MD         Pantoprazole Sodium 40 MG Tbec  Commonly known as:  PROTONIX      Take 1 tab [id number] ipratropium-albuterol (DUONEB) nebulizer solution 3 mL 06/09/20 1926 Given      [id number] ipratropium-albuterol (DUONEB) nebulizer solution 3 mL 06/10/20 0721 Given      [id number] magnesium oxide (MAG-OX) tab 800 mg 06/10/20 0933 Given      1880 Specimen Information    Type Source Collected On   Blood — 06/09/20 2067          Components    Component Value Reference Range Flag Lab   Potassium 4.2 3.5 - 5.1 mmol/L [facility])            Testing Performed By     Lab - Abbreviation Name Direct Order Status:  Completed Lab Status:  Final result Updated:  05/26/20 7222    Specimen:  Other from [place]      Rapid SARS-CoV-2 by PCR Not Detected      Pending Labs     Order Current Status    URINE CULTURE, ROUTINE Preliminary result         H&P - H&P N • OTHER SURGICAL HISTORY  21years old    jaw repair   • SHOULDER OPEN REDUCTION INTERNAL FIXATION Left 4/1/2016    Performed by Arnaud Gustafson MD at [facility] MAIN OR     Social History:  reports that she has been smoking cigarettes.  She started smoking about 4 WBC 9.7   HGB 6.3*   .0*   .0   INR 1.24*     Recent Labs   Lab 05/26/20  1136   *   BUN 7   CREATSERUM 0.86   GFRAA 83   GFRNAA 72   CA 6.7*   ALB 2.0*   *   K 2.2*   CL 85*   CO2 31.0   ALKPHO 214*   AST 49*   ALT 29   BILT 1.5 Hosp Day # 7 PCP Qiana Henderson, YOANA[RH.1]     Date of Admission: 5/26/20  Date of Consult: 6/2/20  Reason for Consultation: Anxiety, depression, alcohol use disoder    Impression:  Primary Psychiatric Diagnosis:  Severe alcohol use disorder (1/5 gallon vod recommended to eval for ulcers, avm's, and signs of malignancy. She refused the procedures, but was deemed non-decisional due to acute cognitive impairment by neuropsych on 6/1/20. Her  wants her to have the procedures.      She is known to the psych • Diarrhea, unspecified    • Sputum production    • Stress    • Vomiting    • Wears glasses      Past Surgical History:   Procedure Laterality Date   • OTHER SURGICAL HISTORY  3/31/16    L arm shoulder repair   • OTHER SURGICAL HISTORY  21years old    jaw Psychiatric/Behavioral: Positive for[RH.4] depression[RH.3]. Negative for[RH.4] suicidal ideas[RH.3]. The patient[RH.4] is nervous/anxious[RH.3]. [RH.4]      Mental Status Exam:    Objective       06/02/20  0756   BP: (!) 181/79   Pulse: 86   Resp: 18   Tem Neurobehavioral cognitive status examination, serial calculations, [employer] test, adaptive digit ordering test, depression rating scale, similarities test, [employer], [employer] Gestalt Test, clock test, trails making unusual profile for someone who is a long-term alcoholic. The fundamental productive organization would be inconsistent with someone with a classic frontal temporal dementia disorder.   Therefore this seems like the current profound impairment is likely ac dementia process over the last 6 months.     Diagnostic impressions   Acute cognitive impairment alcohol induced acute dementia process current profound impairment     Major depressive disorder, severe with agitation    Recommendations  Given the level of c Resolved Goals        Problem: Patient/Family Goals    Goal Priority Disciplines Outcome Interventions   Patient/Family Long Term Goal   (Resolved)     Interdisciplinary Completed    Description:  Patient's Long Term Goal: RETURN HOME    Inter[employer]io

## (undated) NOTE — LETTER
3949 [facility] FOR BLOOD OR BLOOD COMPONENTS      In the course of your treatment, it may become necessary to administer a transfusion of blood or blood components.  This form provides basic information concerning this proc explain the alternatives to you if it has not already been done. I,Nicole Salazar, have read/had read to me the above. I understand the matters bearing on the decision whether or not to authorize a transfusion of blood or blood components.  I have no que

## (undated) NOTE — LETTER
Tootie Gonzalez [address]  [place], [address] [place] [address]    Consent for Operation  Date: __________________                                Time: _______________    1.  I authorize the performance upon Hector Westbrook the following operation:  Procedur procedure has been videotaped, the surgeon will obtain the original videotape. The hospital will not be responsible for storage or maintenance of this tape.   8. For the purpose of advancing medical education, I consent to the admittance of observers to the STATEMENTS REQUIRING INSERTION OR COMPLETION WERE FILLED IN.     Signature of Patient:   ___________________________    When the patient is a minor or mentally incompetent to give consent:  Signature of person authorized to consent for patient: ____________ supplements, and pills I can buy without a prescription (including street drugs/illegal medications). Failure to inform my anesthesiologist about these medicines may increase my risk of anesthetic complications. iv.  If I am allergic to anything or have ha Anesthesiologist Signature     Date   Time  I have discussed the procedure and information above with the patient (or patient’s representative) and answered their questions. The patient or their representative has agreed to have anesthesia services.     ___

## (undated) NOTE — LETTER
Tootie Gonzalez 182 6 [address]  [place], [address] [place] [address]    Consent for Operation  Date: __________________                                Time: _______________    1.  I authorize the performance upon Nathan Arcos the following operation:  Procedjennifer procedure has been videotaped, the surgeon will obtain the original videotape. The hospital will not be responsible for storage or maintenance of this tape.   7. For the purpose of advancing medical education, I consent to the admittance of observers to the STATEMENTS REQUIRING INSERTION OR COMPLETION WERE FILLED IN.     Signature of Patient:   ___________________________    When the patient is a minor or mentally incompetent to give consent:  Signature of person authorized to consent for patient: ____________ supplements, and pills I can buy without a prescription (including street drugs/illegal medications). Failure to inform my anesthesiologist about these medicines may increase my risk of anesthetic complications. iv.  If I am allergic to anything or have ha Anesthesiologist Signature     Date   Time  I have discussed the procedure and information above with the patient (or patient’s representative) and answered their questions. The patient or their representative has agreed to have anesthesia services.     ___

## (undated) NOTE — LETTER
08/02/19        Lynn Rodriguez  [address] [address]      Dear Guevara Pringle records indicate that you have outstanding FASTING lab work and or testing that was ordered for you and has not yet been completed:  Orders Placed This En

## (undated) NOTE — LETTER
3949 [facility] FOR BLOOD OR BLOOD COMPONENTS      In the course of your treatment, it may become necessary to administer a transfusion of blood or blood components.  This form provides basic information concerning this proc explain the alternatives to you if it has not already been done. I,Nicole Salazar, have read/had read to me the above. I understand the matters bearing on the decision whether or not to authorize a transfusion of blood or blood components.  I have no que

## (undated) NOTE — LETTER
[address] [facility]     I agree to have a Peripherally Inserted Central Catheter (PICC) placed in my arm.    1. The PICC insertion procedure, care, maintenance, risks, benefits, and complications have been explained to me by my physic also discussed reasonable alternatives to the PICC, including risks, benefits, and side effects related to the alternatives and risks related to not receiving this procedure.     8.  I have expressed any questions about this procedure to my physician or the

## (undated) NOTE — LETTER
12/13/19        Juana Voss  [address] [address]      Dear Brynn Olson,    1579 [facility] records indicate that you have outstanding lab work and or testing that was ordered for you and has not yet been completed:  Orders Placed This Encounter

## (undated) NOTE — IP AVS SNAPSHOT
[address]            (For Outpatient Use Only) Initial Admit Date: 6/24/2020   Inpt/Obs Admit Date: Inpt: 6/24/20 / Obs: N/A   Discharge Date:    Darrel Armijo:  [de-identified]   MRN: [de-identified]   CSN: [id number]   CEID: [id number] Subscriber Name:  Subscriber :    Subscriber ID:  Pt Rel to Subscriber:    Hospital Account Financial Class:  Other    2020

## (undated) NOTE — LETTER
Tootie Gonzalez 182 6 13[facility]  [place], [address] [place] [address]    Consent for Operation  Date: __________________                                Time: _______________    1.  I authorize the performance upon Raina Rachel the following operation:  Procedjennifer procedure has been videotaped, the surgeon will obtain the original videotape. The hospital will not be responsible for storage or maintenance of this tape.   8. For the purpose of advancing medical education, I consent to the admittance of observers to the STATEMENTS REQUIRING INSERTION OR COMPLETION WERE FILLED IN.     Signature of Patient:   ___________________________    When the patient is a minor or mentally incompetent to give consent:  Signature of person authorized to consent for patient: ____________ supplements, and pills I can buy without a prescription (including street drugs/illegal medications). Failure to inform my anesthesiologist about these medicines may increase my risk of anesthetic complications. iv.  If I am allergic to anything or have ha Anesthesiologist Signature     Date   Time  I have discussed the procedure and information above with the patient (or patient’s representative) and answered their questions. The patient or their representative has agreed to have anesthesia services.     ___

## (undated) NOTE — LETTER
Tootie Gonzalez 182 6 13[facility]  [place], [address] [place] [address]    Consent for Operation  Date: __________________                                Time: _______________    1.  I authorize the performance upon Kenneth Avina the following operation:  Procedur procedure has been videotaped, the surgeon will obtain the original videotape. The hospital will not be responsible for storage or maintenance of this tape.   8. For the purpose of advancing medical education, I consent to the admittance of observers to the STATEMENTS REQUIRING INSERTION OR COMPLETION WERE FILLED IN.     Signature of Patient:   ___________________________    When the patient is a minor or mentally incompetent to give consent:  Signature of person authorized to consent for patient: ____________ supplements, and pills I can buy without a prescription (including street drugs/illegal medications). Failure to inform my anesthesiologist about these medicines may increase my risk of anesthetic complications. iv.  If I am allergic to anything or have ha Anesthesiologist Signature     Date   Time  I have discussed the procedure and information above with the patient (or patient’s representative) and answered their questions. The patient or their representative has agreed to have anesthesia services.     ___

## (undated) NOTE — LETTER
Tootie Gonzalez [address]  [address] [place] [address], [address] [place] [address]    Consent for Operation  Date: __________________                                Time: _______________    1.  I authorize the performance upon Joycelyn Marquez the following operation:  Procedjennifer procedure has been videotaped, the surgeon will obtain the original videotape. The hospital will not be responsible for storage or maintenance of this tape.   8. For the purpose of advancing medical education, I consent to the admittance of observers to the STATEMENTS REQUIRING INSERTION OR COMPLETION WERE FILLED IN.     Signature of Patient:   ___________________________    When the patient is a minor or mentally incompetent to give consent:  Signature of person authorized to consent for patient: ____________ supplements, and pills I can buy without a prescription (including street drugs/illegal medications). Failure to inform my anesthesiologist about these medicines may increase my risk of anesthetic complications. iv.  If I am allergic to anything or have ha Anesthesiologist Signature     Date   Time  I have discussed the procedure and information above with the patient (or patient’s representative) and answered their questions. The patient or their representative has agreed to have anesthesia services.     ___